# Patient Record
Sex: MALE | Race: WHITE | NOT HISPANIC OR LATINO | Employment: OTHER | RURAL
[De-identification: names, ages, dates, MRNs, and addresses within clinical notes are randomized per-mention and may not be internally consistent; named-entity substitution may affect disease eponyms.]

---

## 2017-01-13 ENCOUNTER — CONVERSION ENCOUNTER (OUTPATIENT)
Dept: FAMILY MEDICINE CLINIC | Facility: CLINIC | Age: 82
End: 2017-01-13

## 2017-01-13 LAB
INR PPP: 3.1 (ref 2–3.6)
PROTHROMBIN TIME: 29.2 SECONDS (ref 11.5–13.5)

## 2017-02-13 ENCOUNTER — CONVERSION ENCOUNTER (OUTPATIENT)
Dept: FAMILY MEDICINE CLINIC | Facility: CLINIC | Age: 82
End: 2017-02-13

## 2017-02-13 LAB
INR PPP: 3.6 (ref 2–3.6)
PROTHROMBIN TIME: 33 SECONDS (ref 11.5–13.5)

## 2017-03-24 ENCOUNTER — CONVERSION ENCOUNTER (OUTPATIENT)
Dept: FAMILY MEDICINE CLINIC | Facility: CLINIC | Age: 82
End: 2017-03-24

## 2017-03-24 LAB
INR PPP: 2.9 (ref 2–3.6)
PROTHROMBIN TIME: 35.3 SECONDS (ref 11.5–13.5)

## 2017-05-24 ENCOUNTER — CONVERSION ENCOUNTER (OUTPATIENT)
Dept: FAMILY MEDICINE CLINIC | Facility: CLINIC | Age: 82
End: 2017-05-24

## 2017-05-24 LAB
INR PPP: 3.1 (ref 2–3.6)
PROTHROMBIN TIME: 37.4 SECONDS (ref 11.5–13.5)

## 2017-06-14 ENCOUNTER — CONVERSION ENCOUNTER (OUTPATIENT)
Dept: CARDIOLOGY | Facility: CLINIC | Age: 82
End: 2017-06-14

## 2017-06-23 ENCOUNTER — CONVERSION ENCOUNTER (OUTPATIENT)
Dept: FAMILY MEDICINE CLINIC | Facility: CLINIC | Age: 82
End: 2017-06-23

## 2017-06-23 LAB
INR PPP: 2.4 (ref 2–3.6)
PROTHROMBIN TIME: 29.9 SECONDS (ref 11.5–13.5)

## 2017-07-17 ENCOUNTER — CONVERSION ENCOUNTER (OUTPATIENT)
Dept: FAMILY MEDICINE CLINIC | Facility: CLINIC | Age: 82
End: 2017-07-17

## 2017-07-18 LAB
ALBUMIN SERPL-MCNC: 4.2 G/DL (ref 3.6–5.1)
ALP SERPL-CCNC: 59 U/L (ref 40–115)
AST SERPL-CCNC: 26 U/L (ref 10–35)
BILIRUB SERPL-MCNC: 1.2 MG/DL (ref 0.2–1.2)
BUN SERPL-MCNC: 18 MG/DL (ref 7–25)
BUN/CREAT SERPL: 16.4 (CALC) (ref 6–22)
CALCIUM SERPL-MCNC: 9.4 MG/DL (ref 8.6–10.2)
CHLORIDE SERPL-SCNC: 105 MMOL/L (ref 98–110)
CHOLEST SERPL-MCNC: 166 MG/DL (ref 125–200)
CONV CO2: 24 MMOL/L (ref 21–33)
CONV TOTAL PROTEIN: 6.4 G/DL (ref 6.2–8.3)
CREAT UR-MCNC: 1.1 MG/DL (ref 0.67–1.54)
EOSINOPHIL # BLD AUTO: 200 CELLS/UL (ref 15–500)
EOSINOPHIL # BLD AUTO: 3 %
ERYTHROCYTE [DISTWIDTH] IN BLOOD BY AUTOMATED COUNT: 14.9 % (ref 11–15)
GLOBULIN UR ELPH-MCNC: 2.2 MG/DL (ref 2.1–3.7)
GLUCOSE UR QL: 94 MG/DL (ref 65–99)
HCT VFR BLD AUTO: 41.9 % (ref 38.5–50)
HDLC SERPL-MCNC: 59 MG/DL
HGB BLD-MCNC: 14.1 G/DL (ref 13.2–17.1)
INSULIN SERPL-ACNC: 1.9 (CALC) (ref 1–2.1)
LDLC SERPL CALC-MCNC: 85 MG/DL
LYMPHOCYTES # BLD AUTO: 1200 CELLS/UL (ref 850–3900)
LYMPHOCYTES NFR BLD AUTO: 21 %
MCH RBC QN AUTO: 29.3 PG (ref 27–33)
MCHC RBC AUTO-ENTMCNC: 33.7 G/DL (ref 32–36)
MCV RBC AUTO: 86.8 FL (ref 80–100)
MONOCYTES # BLD AUTO: 800 CELLS/UL (ref 200–950)
MONOCYTES NFR BLD AUTO: 14 %
NEUTROPHILS # BLD AUTO: 3600 CELLS/UL (ref 1500–7800)
NEUTROPHILS NFR BLD AUTO: 61 %
PLATELET # BLD AUTO: 134 10*3/UL (ref 140–400)
PMV BLD AUTO: 8.1 FL (ref 7.5–11.5)
POTASSIUM SERPL-SCNC: 4.1 MMOL/L (ref 3.5–5.3)
RBC # BLD AUTO: 4.83 MILLION/UL (ref 4.2–5.8)
SODIUM SERPL-SCNC: 139 MMOL/L (ref 135–146)
TRIGL SERPL-MCNC: 108 MG/DL
TSH SERPL-ACNC: 2.19 MIU/L (ref 0.4–4.5)
WBC # BLD AUTO: 5.8 10*3/UL (ref 3.8–10.8)

## 2017-09-08 ENCOUNTER — CONVERSION ENCOUNTER (OUTPATIENT)
Dept: FAMILY MEDICINE CLINIC | Facility: CLINIC | Age: 82
End: 2017-09-08

## 2017-09-08 LAB
INR PPP: 3.2 (ref 2–3.6)
PROTHROMBIN TIME: 39.4 SECONDS (ref 11.5–13.5)

## 2017-09-22 ENCOUNTER — CONVERSION ENCOUNTER (OUTPATIENT)
Dept: FAMILY MEDICINE CLINIC | Facility: CLINIC | Age: 82
End: 2017-09-22

## 2017-09-22 LAB
INR PPP: 3.1 (ref 2–3.6)
PROTHROMBIN TIME: 38.2 SECONDS (ref 11.5–13.5)

## 2017-10-23 ENCOUNTER — CONVERSION ENCOUNTER (OUTPATIENT)
Dept: FAMILY MEDICINE CLINIC | Facility: CLINIC | Age: 82
End: 2017-10-23

## 2017-11-27 ENCOUNTER — CONVERSION ENCOUNTER (OUTPATIENT)
Dept: FAMILY MEDICINE CLINIC | Facility: CLINIC | Age: 82
End: 2017-11-27

## 2017-11-27 LAB
INR PPP: 3.6 (ref 2–3.6)
PROTHROMBIN TIME: 43.7 SECONDS (ref 11.5–13.5)

## 2017-12-29 ENCOUNTER — CONVERSION ENCOUNTER (OUTPATIENT)
Dept: FAMILY MEDICINE CLINIC | Facility: CLINIC | Age: 82
End: 2017-12-29

## 2017-12-29 LAB
INR PPP: 3.6 (ref 2–3.6)
PROTHROMBIN TIME: 43.6 SECONDS (ref 11.5–13.5)

## 2018-01-29 ENCOUNTER — CONVERSION ENCOUNTER (OUTPATIENT)
Dept: FAMILY MEDICINE CLINIC | Facility: CLINIC | Age: 83
End: 2018-01-29

## 2018-01-29 LAB
INR PPP: 3.9 (ref 2–3.6)
PROTHROMBIN TIME: 47.4 SECONDS (ref 11.5–13.5)

## 2018-02-12 ENCOUNTER — CONVERSION ENCOUNTER (OUTPATIENT)
Dept: FAMILY MEDICINE CLINIC | Facility: CLINIC | Age: 83
End: 2018-02-12

## 2018-02-12 LAB
INR PPP: 1.7 (ref 2–3.6)
PROTHROMBIN TIME: 20.3 SECONDS (ref 11.5–13.5)

## 2018-02-14 ENCOUNTER — CONVERSION ENCOUNTER (OUTPATIENT)
Dept: CARDIOLOGY | Facility: CLINIC | Age: 83
End: 2018-02-14

## 2018-02-26 ENCOUNTER — CONVERSION ENCOUNTER (OUTPATIENT)
Dept: FAMILY MEDICINE CLINIC | Facility: CLINIC | Age: 83
End: 2018-02-26

## 2018-02-26 LAB
INR PPP: 4 (ref 2–3.6)
PROTHROMBIN TIME: 48.7 SECONDS (ref 11.5–13.5)

## 2018-03-06 ENCOUNTER — CONVERSION ENCOUNTER (OUTPATIENT)
Dept: FAMILY MEDICINE CLINIC | Facility: CLINIC | Age: 83
End: 2018-03-06

## 2018-03-06 LAB
INR PPP: 2.9 (ref 2–3.6)
PROTHROMBIN TIME: 35.9 SECONDS (ref 11.5–13.5)

## 2018-04-03 ENCOUNTER — CONVERSION ENCOUNTER (OUTPATIENT)
Dept: FAMILY MEDICINE CLINIC | Facility: CLINIC | Age: 83
End: 2018-04-03

## 2018-04-03 LAB
INR PPP: 3.5 (ref 2–3.6)
PROTHROMBIN TIME: 43 SECONDS (ref 11.5–13.5)

## 2018-06-15 ENCOUNTER — CONVERSION ENCOUNTER (OUTPATIENT)
Dept: FAMILY MEDICINE CLINIC | Facility: CLINIC | Age: 83
End: 2018-06-15

## 2018-06-15 LAB
INR PPP: 3.5 (ref 2–3.6)
PROTHROMBIN TIME: 43.5 SECONDS (ref 11.5–13.5)

## 2018-07-23 ENCOUNTER — CONVERSION ENCOUNTER (OUTPATIENT)
Dept: FAMILY MEDICINE CLINIC | Facility: CLINIC | Age: 83
End: 2018-07-23

## 2018-07-23 LAB
INR PPP: 2.5 (ref 2–3.6)
PROTHROMBIN TIME: 26.4 SECONDS (ref 11.5–13.5)

## 2018-08-28 ENCOUNTER — CONVERSION ENCOUNTER (OUTPATIENT)
Dept: FAMILY MEDICINE CLINIC | Facility: CLINIC | Age: 83
End: 2018-08-28

## 2018-08-28 LAB
INR PPP: 3.6 (ref 2–3.6)
PROTHROMBIN TIME: 44.5 SECONDS (ref 11.5–13.5)

## 2018-10-04 ENCOUNTER — CONVERSION ENCOUNTER (OUTPATIENT)
Dept: CARDIOLOGY | Facility: CLINIC | Age: 83
End: 2018-10-04

## 2018-10-29 ENCOUNTER — CONVERSION ENCOUNTER (OUTPATIENT)
Dept: FAMILY MEDICINE CLINIC | Facility: CLINIC | Age: 83
End: 2018-10-29

## 2018-10-29 LAB
INR PPP: 3.2 (ref 2–3.6)
PROTHROMBIN TIME: 40.6 SECONDS (ref 11.5–13.5)

## 2018-11-21 ENCOUNTER — CONVERSION ENCOUNTER (OUTPATIENT)
Dept: FAMILY MEDICINE CLINIC | Facility: CLINIC | Age: 83
End: 2018-11-21

## 2018-11-21 LAB
INR PPP: 2.3 (ref 2–3.6)
PROTHROMBIN TIME: 28.1 SECONDS (ref 11.5–13.5)

## 2018-12-05 ENCOUNTER — CONVERSION ENCOUNTER (OUTPATIENT)
Dept: FAMILY MEDICINE CLINIC | Facility: CLINIC | Age: 83
End: 2018-12-05

## 2018-12-05 LAB
INR PPP: 3.7 (ref 2–3.6)
PROTHROMBIN TIME: 46.5 SECONDS (ref 11.5–13.5)

## 2018-12-19 ENCOUNTER — CONVERSION ENCOUNTER (OUTPATIENT)
Dept: FAMILY MEDICINE CLINIC | Facility: CLINIC | Age: 83
End: 2018-12-19

## 2018-12-19 LAB
INR PPP: 4.1 (ref 2–3.6)
PROTHROMBIN TIME: 52.6 SECONDS (ref 11.5–13.5)

## 2018-12-26 ENCOUNTER — CONVERSION ENCOUNTER (OUTPATIENT)
Dept: FAMILY MEDICINE CLINIC | Facility: CLINIC | Age: 83
End: 2018-12-26

## 2018-12-26 LAB
INR PPP: 2.8 (ref 2–3.6)
PROTHROMBIN TIME: 35.3 SECONDS (ref 11.5–13.5)

## 2019-01-09 ENCOUNTER — CONVERSION ENCOUNTER (OUTPATIENT)
Dept: FAMILY MEDICINE CLINIC | Facility: CLINIC | Age: 84
End: 2019-01-09

## 2019-01-09 LAB
INR PPP: 3 (ref 2–3.6)
PROTHROMBIN TIME: 37.5 SECONDS (ref 11.5–13.5)

## 2019-01-23 ENCOUNTER — CONVERSION ENCOUNTER (OUTPATIENT)
Dept: FAMILY MEDICINE CLINIC | Facility: CLINIC | Age: 84
End: 2019-01-23

## 2019-01-23 LAB
INR PPP: 2.7 (ref 2–3.6)
PROTHROMBIN TIME: 33.4 SECONDS (ref 11.5–13.5)

## 2019-03-27 ENCOUNTER — CONVERSION ENCOUNTER (OUTPATIENT)
Dept: CARDIOLOGY | Facility: CLINIC | Age: 84
End: 2019-03-27

## 2019-04-16 ENCOUNTER — CONVERSION ENCOUNTER (OUTPATIENT)
Dept: FAMILY MEDICINE CLINIC | Facility: CLINIC | Age: 84
End: 2019-04-16

## 2019-04-16 LAB
INR PPP: 2.3 (ref 2–3.6)
PROTHROMBIN TIME: 28.1 SECONDS (ref 11.5–13.5)

## 2019-04-17 ENCOUNTER — CONVERSION ENCOUNTER (OUTPATIENT)
Dept: CARDIOLOGY | Facility: CLINIC | Age: 84
End: 2019-04-17

## 2019-05-15 ENCOUNTER — CONVERSION ENCOUNTER (OUTPATIENT)
Dept: FAMILY MEDICINE CLINIC | Facility: CLINIC | Age: 84
End: 2019-05-15

## 2019-05-15 LAB
INR PPP: 1.6 (ref 2–3.6)
PROTHROMBIN TIME: 19.7 SECONDS (ref 11.5–13.5)

## 2019-05-29 LAB — INR PPP: 2.7 (ref 2–3)

## 2019-06-04 VITALS
SYSTOLIC BLOOD PRESSURE: 195 MMHG | OXYGEN SATURATION: 98 % | OXYGEN SATURATION: 99 % | OXYGEN SATURATION: 99 % | HEART RATE: 59 BPM | SYSTOLIC BLOOD PRESSURE: 168 MMHG | HEART RATE: 66 BPM | HEART RATE: 64 BPM | WEIGHT: 186.25 LBS | WEIGHT: 185.5 LBS | WEIGHT: 184.5 LBS | SYSTOLIC BLOOD PRESSURE: 165 MMHG | WEIGHT: 180.25 LBS | HEART RATE: 60 BPM | WEIGHT: 182.5 LBS | DIASTOLIC BLOOD PRESSURE: 99 MMHG | HEART RATE: 79 BPM | SYSTOLIC BLOOD PRESSURE: 138 MMHG | DIASTOLIC BLOOD PRESSURE: 89 MMHG | DIASTOLIC BLOOD PRESSURE: 107 MMHG | OXYGEN SATURATION: 97 % | OXYGEN SATURATION: 97 % | DIASTOLIC BLOOD PRESSURE: 104 MMHG | SYSTOLIC BLOOD PRESSURE: 182 MMHG | DIASTOLIC BLOOD PRESSURE: 92 MMHG

## 2019-06-12 VITALS
OXYGEN SATURATION: 99 % | RESPIRATION RATE: 20 BRPM | WEIGHT: 182 LBS | BODY MASS INDEX: 26.04 KG/M2 | HEIGHT: 71 IN | BODY MASS INDEX: 26.88 KG/M2 | HEIGHT: 72 IN | WEIGHT: 178.2 LBS | DIASTOLIC BLOOD PRESSURE: 92 MMHG | WEIGHT: 183 LBS | HEART RATE: 76 BPM | HEART RATE: 75 BPM | DIASTOLIC BLOOD PRESSURE: 97 MMHG | BODY MASS INDEX: 26.06 KG/M2 | DIASTOLIC BLOOD PRESSURE: 89 MMHG | HEART RATE: 97 BPM | HEART RATE: 65 BPM | BODY MASS INDEX: 26.54 KG/M2 | HEIGHT: 70 IN | HEIGHT: 70 IN | SYSTOLIC BLOOD PRESSURE: 140 MMHG | OXYGEN SATURATION: 98 % | HEIGHT: 70 IN | WEIGHT: 185.6 LBS | HEART RATE: 60 BPM | RESPIRATION RATE: 20 BRPM | RESPIRATION RATE: 20 BRPM | WEIGHT: 187.8 LBS | SYSTOLIC BLOOD PRESSURE: 148 MMHG | HEIGHT: 71 IN | RESPIRATION RATE: 18 BRPM | HEART RATE: 89 BPM | OXYGEN SATURATION: 93 % | OXYGEN SATURATION: 98 % | SYSTOLIC BLOOD PRESSURE: 151 MMHG | DIASTOLIC BLOOD PRESSURE: 84 MMHG | OXYGEN SATURATION: 98 % | HEART RATE: 80 BPM | HEIGHT: 71 IN | DIASTOLIC BLOOD PRESSURE: 70 MMHG | DIASTOLIC BLOOD PRESSURE: 98 MMHG | HEIGHT: 70 IN | BODY MASS INDEX: 25.4 KG/M2 | RESPIRATION RATE: 19 BRPM | HEART RATE: 89 BPM | WEIGHT: 183.8 LBS | WEIGHT: 185.8 LBS | BODY MASS INDEX: 26.26 KG/M2 | HEIGHT: 72 IN | WEIGHT: 185 LBS | HEART RATE: 89 BPM | HEIGHT: 70 IN | DIASTOLIC BLOOD PRESSURE: 82 MMHG | WEIGHT: 184.12 LBS | HEART RATE: 87 BPM | RESPIRATION RATE: 20 BRPM | OXYGEN SATURATION: 96 % | OXYGEN SATURATION: 99 % | WEIGHT: 184.4 LBS | RESPIRATION RATE: 18 BRPM | HEIGHT: 71 IN | WEIGHT: 184 LBS | HEIGHT: 70 IN | BODY MASS INDEX: 26.48 KG/M2 | WEIGHT: 185.37 LBS | WEIGHT: 184 LBS | WEIGHT: 184.12 LBS | HEART RATE: 84 BPM | WEIGHT: 184.12 LBS | DIASTOLIC BLOOD PRESSURE: 80 MMHG | HEART RATE: 84 BPM | BODY MASS INDEX: 25.06 KG/M2 | OXYGEN SATURATION: 96 % | RESPIRATION RATE: 14 BRPM | HEART RATE: 82 BPM | HEART RATE: 82 BPM | HEIGHT: 71 IN | RESPIRATION RATE: 20 BRPM | BODY MASS INDEX: 25.76 KG/M2 | OXYGEN SATURATION: 99 % | HEART RATE: 78 BPM | BODY MASS INDEX: 26.21 KG/M2 | BODY MASS INDEX: 25.81 KG/M2 | SYSTOLIC BLOOD PRESSURE: 134 MMHG | BODY MASS INDEX: 26.01 KG/M2 | OXYGEN SATURATION: 98 % | HEART RATE: 82 BPM | SYSTOLIC BLOOD PRESSURE: 138 MMHG | WEIGHT: 183.6 LBS | HEIGHT: 71 IN | WEIGHT: 181.4 LBS | HEIGHT: 71 IN | SYSTOLIC BLOOD PRESSURE: 150 MMHG | HEIGHT: 70 IN | OXYGEN SATURATION: 98 % | SYSTOLIC BLOOD PRESSURE: 130 MMHG | DIASTOLIC BLOOD PRESSURE: 100 MMHG | SYSTOLIC BLOOD PRESSURE: 170 MMHG | RESPIRATION RATE: 20 BRPM | OXYGEN SATURATION: 98 % | OXYGEN SATURATION: 96 % | HEART RATE: 85 BPM | SYSTOLIC BLOOD PRESSURE: 156 MMHG | DIASTOLIC BLOOD PRESSURE: 92 MMHG | RESPIRATION RATE: 18 BRPM | HEIGHT: 70 IN | OXYGEN SATURATION: 97 % | BODY MASS INDEX: 24.94 KG/M2 | DIASTOLIC BLOOD PRESSURE: 80 MMHG | SYSTOLIC BLOOD PRESSURE: 132 MMHG | WEIGHT: 191 LBS | DIASTOLIC BLOOD PRESSURE: 87 MMHG | OXYGEN SATURATION: 99 % | OXYGEN SATURATION: 98 % | HEART RATE: 87 BPM | OXYGEN SATURATION: 94 % | SYSTOLIC BLOOD PRESSURE: 160 MMHG | HEART RATE: 68 BPM | OXYGEN SATURATION: 98 % | HEART RATE: 86 BPM | HEIGHT: 71 IN | DIASTOLIC BLOOD PRESSURE: 80 MMHG | SYSTOLIC BLOOD PRESSURE: 134 MMHG | OXYGEN SATURATION: 99 % | HEIGHT: 72 IN | HEIGHT: 72 IN | SYSTOLIC BLOOD PRESSURE: 144 MMHG | RESPIRATION RATE: 16 BRPM | SYSTOLIC BLOOD PRESSURE: 138 MMHG | SYSTOLIC BLOOD PRESSURE: 152 MMHG | OXYGEN SATURATION: 98 % | BODY MASS INDEX: 25.7 KG/M2 | RESPIRATION RATE: 20 BRPM | WEIGHT: 187.4 LBS | DIASTOLIC BLOOD PRESSURE: 88 MMHG | HEART RATE: 70 BPM | SYSTOLIC BLOOD PRESSURE: 136 MMHG | SYSTOLIC BLOOD PRESSURE: 132 MMHG | DIASTOLIC BLOOD PRESSURE: 92 MMHG | BODY MASS INDEX: 25.98 KG/M2 | WEIGHT: 187.2 LBS | WEIGHT: 189 LBS | WEIGHT: 183.4 LBS | OXYGEN SATURATION: 97 % | DIASTOLIC BLOOD PRESSURE: 90 MMHG | HEART RATE: 86 BPM | RESPIRATION RATE: 20 BRPM | OXYGEN SATURATION: 97 % | HEIGHT: 71 IN | DIASTOLIC BLOOD PRESSURE: 88 MMHG | OXYGEN SATURATION: 97 % | HEIGHT: 71 IN | SYSTOLIC BLOOD PRESSURE: 145 MMHG | RESPIRATION RATE: 18 BRPM | SYSTOLIC BLOOD PRESSURE: 140 MMHG | OXYGEN SATURATION: 97 % | RESPIRATION RATE: 16 BRPM | HEART RATE: 84 BPM | BODY MASS INDEX: 25.6 KG/M2 | HEART RATE: 81 BPM | HEIGHT: 71 IN | BODY MASS INDEX: 26.83 KG/M2 | DIASTOLIC BLOOD PRESSURE: 91 MMHG | HEART RATE: 81 BPM | DIASTOLIC BLOOD PRESSURE: 75 MMHG | SYSTOLIC BLOOD PRESSURE: 136 MMHG | HEART RATE: 60 BPM | WEIGHT: 192.4 LBS | SYSTOLIC BLOOD PRESSURE: 148 MMHG | DIASTOLIC BLOOD PRESSURE: 86 MMHG | WEIGHT: 186 LBS | BODY MASS INDEX: 24.94 KG/M2 | SYSTOLIC BLOOD PRESSURE: 150 MMHG | RESPIRATION RATE: 18 BRPM | HEIGHT: 72 IN | WEIGHT: 179 LBS | DIASTOLIC BLOOD PRESSURE: 88 MMHG | BODY MASS INDEX: 25.73 KG/M2 | RESPIRATION RATE: 18 BRPM | HEIGHT: 71 IN | DIASTOLIC BLOOD PRESSURE: 102 MMHG | RESPIRATION RATE: 20 BRPM | RESPIRATION RATE: 19 BRPM | BODY MASS INDEX: 26.34 KG/M2 | RESPIRATION RATE: 18 BRPM | RESPIRATION RATE: 21 BRPM | BODY MASS INDEX: 24.94 KG/M2 | RESPIRATION RATE: 20 BRPM | DIASTOLIC BLOOD PRESSURE: 86 MMHG | OXYGEN SATURATION: 97 % | DIASTOLIC BLOOD PRESSURE: 82 MMHG | DIASTOLIC BLOOD PRESSURE: 75 MMHG | SYSTOLIC BLOOD PRESSURE: 138 MMHG | RESPIRATION RATE: 20 BRPM | BODY MASS INDEX: 25.62 KG/M2 | BODY MASS INDEX: 26.27 KG/M2 | SYSTOLIC BLOOD PRESSURE: 149 MMHG | SYSTOLIC BLOOD PRESSURE: 128 MMHG | BODY MASS INDEX: 26.74 KG/M2 | OXYGEN SATURATION: 98 % | RESPIRATION RATE: 18 BRPM | BODY MASS INDEX: 24.95 KG/M2 | WEIGHT: 183.5 LBS | BODY MASS INDEX: 26.05 KG/M2 | HEIGHT: 71 IN | SYSTOLIC BLOOD PRESSURE: 147 MMHG | DIASTOLIC BLOOD PRESSURE: 78 MMHG

## 2019-06-28 ENCOUNTER — CLINICAL SUPPORT (OUTPATIENT)
Dept: FAMILY MEDICINE CLINIC | Facility: CLINIC | Age: 84
End: 2019-06-28

## 2019-06-28 VITALS
WEIGHT: 182 LBS | RESPIRATION RATE: 17 BRPM | TEMPERATURE: 97.8 F | HEART RATE: 64 BPM | OXYGEN SATURATION: 98 % | BODY MASS INDEX: 26.11 KG/M2

## 2019-06-28 DIAGNOSIS — Z95.2 HISTORY OF MITRAL VALVE REPLACEMENT: Primary | ICD-10-CM

## 2019-06-28 DIAGNOSIS — Z95.2 HEART VALVE REPLACED: Primary | ICD-10-CM

## 2019-06-28 PROBLEM — I10 HYPERTENSION: Status: ACTIVE | Noted: 2019-06-28

## 2019-06-28 PROBLEM — I48.91 ATRIAL FIBRILLATION (HCC): Status: ACTIVE | Noted: 2019-06-28

## 2019-06-28 PROBLEM — I25.10 CORONARY ARTERY DISEASE: Status: ACTIVE | Noted: 2019-06-28

## 2019-06-28 PROBLEM — I63.9 CEREBROVASCULAR ACCIDENT (CVA) (HCC): Status: ACTIVE | Noted: 2019-06-28

## 2019-06-28 PROBLEM — E78.5 HYPERLIPIDEMIA: Status: ACTIVE | Noted: 2019-06-28

## 2019-06-28 LAB
INR PPP: 2.3 (ref 2–3)
INR PPP: 2.3 (ref 2–3)

## 2019-06-28 PROCEDURE — 93793 ANTICOAG MGMT PT WARFARIN: CPT | Performed by: FAMILY MEDICINE

## 2019-06-28 PROCEDURE — 85610 PROTHROMBIN TIME: CPT | Performed by: FAMILY MEDICINE

## 2019-06-28 PROCEDURE — 36416 COLLJ CAPILLARY BLOOD SPEC: CPT | Performed by: FAMILY MEDICINE

## 2019-06-28 RX ORDER — LOVASTATIN 20 MG/1
40 TABLET ORAL DAILY
Refills: 3 | COMMUNITY
Start: 2019-06-08 | End: 2020-02-26 | Stop reason: SDUPTHER

## 2019-06-28 RX ORDER — LISINOPRIL 10 MG/1
10 TABLET ORAL DAILY
Refills: 12 | COMMUNITY
Start: 2019-06-08 | End: 2020-02-26

## 2019-06-28 RX ORDER — WARFARIN SODIUM 3 MG/1
3 TABLET ORAL EVERY 24 HOURS
Qty: 45 TABLET | Refills: 1 | Status: SHIPPED | OUTPATIENT
Start: 2019-06-28 | End: 2019-09-27 | Stop reason: SDUPTHER

## 2019-06-28 RX ORDER — WARFARIN SODIUM 3 MG/1
TABLET ORAL EVERY 24 HOURS
COMMUNITY
Start: 2016-02-16 | End: 2019-06-28 | Stop reason: SDUPTHER

## 2019-06-28 NOTE — TELEPHONE ENCOUNTER
Patient was last seen on, 6- for PT/INR check.    Patient has an appointment to be seen on, 7- PT/INR check.    Patient is completely out of medication.    Please advise.  Thanks!

## 2019-07-22 ENCOUNTER — CLINICAL SUPPORT NO REQUIREMENTS (OUTPATIENT)
Dept: CARDIOLOGY | Facility: CLINIC | Age: 84
End: 2019-07-22

## 2019-07-22 DIAGNOSIS — Z95.0 PACEMAKER: ICD-10-CM

## 2019-07-22 DIAGNOSIS — R00.1 BRADYCARDIA: Primary | ICD-10-CM

## 2019-07-22 PROCEDURE — 93296 REM INTERROG EVL PM/IDS: CPT | Performed by: INTERNAL MEDICINE

## 2019-07-22 PROCEDURE — 93294 REM INTERROG EVL PM/LDLS PM: CPT | Performed by: INTERNAL MEDICINE

## 2019-07-26 ENCOUNTER — CLINICAL SUPPORT (OUTPATIENT)
Dept: FAMILY MEDICINE CLINIC | Facility: CLINIC | Age: 84
End: 2019-07-26

## 2019-07-26 VITALS
RESPIRATION RATE: 20 BRPM | OXYGEN SATURATION: 98 % | SYSTOLIC BLOOD PRESSURE: 154 MMHG | HEART RATE: 64 BPM | TEMPERATURE: 97.7 F | BODY MASS INDEX: 25.91 KG/M2 | DIASTOLIC BLOOD PRESSURE: 90 MMHG | WEIGHT: 180.6 LBS

## 2019-07-26 DIAGNOSIS — I48.91 ATRIAL FIBRILLATION, UNSPECIFIED TYPE (HCC): Primary | ICD-10-CM

## 2019-07-26 LAB
INR PPP: 2.5 (ref 0.9–1.1)
INR PPP: 2.5 (ref 0.9–1.1)
PROTHROMBIN TIME: 31.4 SECONDS

## 2019-07-26 PROCEDURE — 36416 COLLJ CAPILLARY BLOOD SPEC: CPT | Performed by: FAMILY MEDICINE

## 2019-07-26 PROCEDURE — 93793 ANTICOAG MGMT PT WARFARIN: CPT | Performed by: FAMILY MEDICINE

## 2019-07-26 PROCEDURE — 85610 PROTHROMBIN TIME: CPT | Performed by: FAMILY MEDICINE

## 2019-08-02 ENCOUNTER — OFFICE VISIT (OUTPATIENT)
Dept: FAMILY MEDICINE CLINIC | Facility: CLINIC | Age: 84
End: 2019-08-02

## 2019-08-02 VITALS
SYSTOLIC BLOOD PRESSURE: 148 MMHG | DIASTOLIC BLOOD PRESSURE: 80 MMHG | RESPIRATION RATE: 18 BRPM | WEIGHT: 179.8 LBS | HEIGHT: 70 IN | HEART RATE: 68 BPM | TEMPERATURE: 97.2 F | BODY MASS INDEX: 25.74 KG/M2 | OXYGEN SATURATION: 99 %

## 2019-08-02 DIAGNOSIS — I10 ESSENTIAL HYPERTENSION: ICD-10-CM

## 2019-08-02 DIAGNOSIS — Z00.00 MEDICARE ANNUAL WELLNESS VISIT, SUBSEQUENT: Primary | ICD-10-CM

## 2019-08-02 DIAGNOSIS — R53.83 MALAISE AND FATIGUE: ICD-10-CM

## 2019-08-02 DIAGNOSIS — Z95.0 PRESENCE OF CARDIAC PACEMAKER: ICD-10-CM

## 2019-08-02 DIAGNOSIS — Z95.2 HISTORY OF HEART VALVE REPLACEMENT: ICD-10-CM

## 2019-08-02 DIAGNOSIS — E78.2 MIXED HYPERLIPIDEMIA: ICD-10-CM

## 2019-08-02 DIAGNOSIS — R53.81 MALAISE AND FATIGUE: ICD-10-CM

## 2019-08-02 PROBLEM — H61.23 BILATERAL IMPACTED CERUMEN: Status: ACTIVE | Noted: 2019-08-02

## 2019-08-02 LAB
BILIRUB BLD-MCNC: NEGATIVE MG/DL
CLARITY, POC: CLEAR
COLOR UR: YELLOW
GLUCOSE UR STRIP-MCNC: NEGATIVE MG/DL
KETONES UR QL: NEGATIVE
LEUKOCYTE EST, POC: NEGATIVE
NITRITE UR-MCNC: NEGATIVE MG/ML
PH UR: 7 [PH] (ref 5–8)
PROT UR STRIP-MCNC: NEGATIVE MG/DL
RBC # UR STRIP: NEGATIVE /UL
SP GR UR: 1.02 (ref 1–1.03)
UROBILINOGEN UR QL: NORMAL

## 2019-08-02 PROCEDURE — 99497 ADVNCD CARE PLAN 30 MIN: CPT | Performed by: FAMILY MEDICINE

## 2019-08-02 PROCEDURE — 81002 URINALYSIS NONAUTO W/O SCOPE: CPT | Performed by: FAMILY MEDICINE

## 2019-08-02 PROCEDURE — G0444 DEPRESSION SCREEN ANNUAL: HCPCS | Performed by: FAMILY MEDICINE

## 2019-08-02 PROCEDURE — 99213 OFFICE O/P EST LOW 20 MIN: CPT | Performed by: FAMILY MEDICINE

## 2019-08-02 PROCEDURE — G0439 PPPS, SUBSEQ VISIT: HCPCS | Performed by: FAMILY MEDICINE

## 2019-08-02 NOTE — PROGRESS NOTES
Subjective   Jean Pierre Lazo is a 89 y.o. male.     Chief Complaint   Patient presents with   • Medicare Wellness-subsequent     last ekg 9/2367/2016 last echo 8/2/2016   • Hypertension   • Heart Problem     has a pacemaker last pt/inr was 7/26/2019 and was 2.5 he is taking coumadin 3.5mg on monday and 3mg all other days.       The patient is here: to discuss health maintenance and disease prevention to follow up on multiple medical problems.  Last comprehensive physical was on 7/2018.  Patient's previous physician was Dr.John Cage.  Overall has: moderate activity with work/home activities, good appetite, poor appetite, feels well with minor complaints, decreased energy level and is sleeping well. Nutrition: balanced diet. Last tetanus shot was 7/15/2018. Patient's last colonoscopy was: 1/6/2010.    Hypertension   This is a chronic problem. The current episode started more than 1 year ago. The problem is unchanged. Associated symptoms include malaise/fatigue. Pertinent negatives include no chest pain, palpitations or shortness of breath. Risk factors for coronary artery disease include male gender. Current antihypertension treatment includes ACE inhibitors. There are no compliance problems.         Recent Hospitalizations:  No hospitalization(s) within the last year..  ccc    I personally reviewed and updated the patient's allergies, medications, problem list, and past medical, surgical, social, and family history.     History reviewed. No pertinent family history.    Social History     Tobacco Use   • Smoking status: Never Smoker   • Smokeless tobacco: Never Used   Substance Use Topics   • Alcohol use: No     Frequency: Never   • Drug use: No       Past Surgical History:   Procedure Laterality Date   • CARDIAC VALVE SURGERY     • CHOLECYSTECTOMY     • PACEMAKER IMPLANTATION         Patient Active Problem List   Diagnosis   • Atrial fibrillation (CMS/HCC)   • Atrioventricular block   • Cerebrovascular accident  "(CVA) (CMS/HCC)   • Cholelithiasis with cholecystitis   • Coronary artery disease   • History of heart valve replacement   • Hyperlipidemia   • Hypertension   • Mitral valve disease   • Presence of aortocoronary bypass graft   • Presence of cardiac pacemaker   • Medicare annual wellness visit, subsequent   • Bilateral impacted cerumen         Current Outpatient Medications:   •  lisinopril (PRINIVIL,ZESTRIL) 10 MG tablet, Take 10 mg by mouth Daily., Disp: , Rfl: 12  •  lovastatin (MEVACOR) 20 MG tablet, Take 40 mg by mouth Daily., Disp: , Rfl: 3  •  metoprolol tartrate (LOPRESSOR) 25 MG tablet, METOPROLOL TARTRATE 25 MG TABS, Disp: , Rfl:   •  Multiple Vitamins-Minerals (MULTI VITAMIN/MINERALS) tablet, MULTI VITAMIN/MINERALS TABS, Disp: , Rfl:   •  warfarin (COUMADIN) 3 MG tablet, Take 1 tablet by mouth Daily., Disp: 45 tablet, Rfl: 1  •  docusate (COLACE) 60 MG/15ML syrup, 5 drops to each ear 3 nights per week at , dispense qs with ear dropper, Disp: 30 mL, Rfl: 11         Review of Systems   Constitutional: Positive for malaise/fatigue. Negative for chills and diaphoresis.   HENT: Negative for trouble swallowing and voice change.    Eyes: Negative for visual disturbance.   Respiratory: Negative for shortness of breath.    Cardiovascular: Negative for chest pain and palpitations.   Gastrointestinal: Negative for abdominal pain and nausea.   Endocrine: Negative for polydipsia and polyphagia.   Genitourinary: Negative for hematuria.   Musculoskeletal: Negative for neck stiffness.   Skin: Negative for color change and pallor.   Allergic/Immunologic: Negative for immunocompromised state.   Neurological: Negative for seizures and syncope.   Hematological: Negative for adenopathy.   Psychiatric/Behavioral: Negative for sleep disturbance and suicidal ideas.       Objective   /80   Pulse 68   Temp 97.2 °F (36.2 °C)   Resp 18   Ht 177.8 cm (70\")   Wt 81.6 kg (179 lb 12.8 oz)   SpO2 99%   BMI 25.80 kg/m²   BP " Readings from Last 3 Encounters:   08/02/19 148/80   07/26/19 154/90   05/29/19 140/88     Wt Readings from Last 3 Encounters:   08/02/19 81.6 kg (179 lb 12.8 oz)   07/26/19 81.9 kg (180 lb 9.6 oz)   06/28/19 82.6 kg (182 lb)     Physical Exam   Constitutional: He is oriented to person, place, and time. He appears well-developed and well-nourished.   HENT:   Head: Normocephalic.   Right Ear: Tympanic membrane, external ear and ear canal normal.   Left Ear: Tympanic membrane, external ear and ear canal normal.   Nose: Nose normal.   Eyes: Conjunctivae, EOM and lids are normal. Pupils are equal, round, and reactive to light.   Neck: No JVD present. Carotid bruit is not present. No tracheal deviation present. No thyromegaly present.   Cardiovascular: Normal rate, regular rhythm, normal heart sounds and intact distal pulses. Exam reveals no gallop and no friction rub.   No murmur heard.  Pulmonary/Chest: Effort normal and breath sounds normal. No stridor. He has no decreased breath sounds. He has no wheezes. He has no rales.   Abdominal: Soft. Bowel sounds are normal. He exhibits no distension and no mass. There is no tenderness. There is no rebound and no guarding. No hernia.   Lymphadenopathy:        Head (right side): No submental, no submandibular, no tonsillar, no preauricular, no posterior auricular and no occipital adenopathy present.        Head (left side): No submental, no submandibular, no tonsillar, no preauricular, no posterior auricular and no occipital adenopathy present.     He has no cervical adenopathy.   Neurological: He is alert and oriented to person, place, and time. He has normal strength and normal reflexes. No cranial nerve deficit or sensory deficit. Coordination and gait normal.   Skin: Skin is warm and dry. Turgor is normal. He is not diaphoretic. No pallor.       Recent Lab Results:    Lab Results   Component Value Date    GLU 92 08/02/2019     Lab Results   Component Value Date    HGBA1C  4.8 06/06/2016     Lab Results   Component Value Date    CHOL 136 07/20/2018    CHOL 166 07/17/2017    CHOL 157 05/03/2016    CHLPL 144 08/02/2019     Lab Results   Component Value Date    TRIG 101 08/02/2019    TRIG 94 07/20/2018    TRIG 108 07/17/2017     Lab Results   Component Value Date    HDL 53 08/02/2019    HDL 46 07/20/2018    HDL 59 07/17/2017     Lab Results   Component Value Date    LDL 71 08/02/2019    LDL 71 07/20/2018    LDL 85 07/17/2017     No results found for: PSA  Lab Results   Component Value Date    WBC 7.2 08/02/2019    HGB 14.4 08/02/2019    HCT 42.9 08/02/2019    MCV 89 08/02/2019     (L) 08/02/2019     Lab Results   Component Value Date    TSH 1.850 08/02/2019     Lab Results   Component Value Date    GLUCOSE 101 (H) 03/29/2019    BUN 17 08/02/2019    CREATININE 1.06 08/02/2019    EGFRIFNONA 62 08/02/2019    EGFRIFAFRI 72 08/02/2019    BCR 16 08/02/2019    K 4.7 08/02/2019    CO2 23 08/02/2019    CALCIUM 9.4 08/02/2019    PROTENTOTREF 6.6 08/02/2019    ALBUMIN 4.2 08/02/2019    LABIL2 1.8 08/02/2019    AST 27 08/02/2019    ALT 8 08/02/2019         Age-appropriate Screening Schedule:  Refer to the list below for future screening recommendations based on patient's age, sex and/or medical conditions. Orders for these recommended tests are listed in the plan section. The patient has been provided with a written plan.    Health Maintenance   Topic Date Due   • TDAP/TD VACCINES (1 - Tdap) 07/25/1949   • ZOSTER VACCINE (1 of 2) 07/25/1980   • PNEUMOCOCCAL VACCINES (65+ LOW/MEDIUM RISK) (1 of 2 - PCV13) 07/25/1995   • INFLUENZA VACCINE  08/01/2019   • LIPID PANEL  08/02/2020       Depression Screen:   PHQ-2/PHQ-9 Depression Screening 8/2/2019   Little interest or pleasure in doing things 0   Feeling down, depressed, or hopeless 0   Trouble falling or staying asleep, or sleeping too much 0   Feeling tired or having little energy 0   Poor appetite or overeating 0   Feeling bad about yourself -  or that you are a failure or have let yourself or your family down 0   Trouble concentrating on things, such as reading the newspaper or watching television 0   Moving or speaking so slowly that other people could have noticed. Or the opposite - being so fidgety or restless that you have been moving around a lot more than usual 0   Thoughts that you would be better off dead, or of hurting yourself in some way 0   Total Score 0   If you checked off any problems, how difficult have these problems made it for you to do your work, take care of things at home, or get along with other people? Not difficult at all     I spent more than 16 minutes asking patient questions, counseling and documenting in the chart.    Health Habits and Functional and Cognitive Screening:  Functional & Cognitive Status 8/2/2019   Do you have difficulty preparing food and eating? No   Do you have difficulty bathing yourself, getting dressed or grooming yourself? No   Do you have difficulty using the toilet? No   Do you have difficulty moving around from place to place? No   Do you have trouble with steps or getting out of a bed or a chair? No   Current Diet Well Balanced Diet   Dental Exam Up to date   Eye Exam Up to date   Exercise (times per week) 5 times per week   Current Exercise Activities Include Yard Work   Do you need help using the phone?  No   Are you deaf or do you have serious difficulty hearing?  No   Do you need help with transportation? No   Do you need help shopping? No   Do you need help preparing meals?  No   Do you need help with housework?  No   Do you need help with laundry? No   Do you need help taking your medications? No   Do you need help managing money? No   Do you ever drive or ride in a car without wearing a seat belt? No   Have you felt unusual stress, anger or loneliness in the last month? No   Who do you live with? Spouse   If you need help, do you have trouble finding someone available to you? No   Have you been  bothered in the last four weeks by sexual problems? No   Do you have difficulty concentrating, remembering or making decisions? No       Does the patient have evidence of cognitive impairment? No    Advance Care Planning:  Patient has an advance directive - a copy has been provided and is visible in patient header     A face-to-face visit was completed today with patient.  Counseling explanation, and discussion of advanced directives was performed.   The last advanced care visit was performed in 2018.  In a near life ending situation, from which he is not expected to recover functionally, and he is not able to speak for him, he does not want life sustaining measures. We discussed feeding tubes, ventilators and cardiac support as well as dialysis.    I spent more than 16 minutes discussing Advanced Care Planning information and documenting in the chart.    Identification of Risk Factors:  Risk factors include: Cardiovascular risk  Dementia/Memory   Fall Risk  Hearing Problem.    Compared to one year ago, the patient feels his physical health is the same.  Compared to one year ago, the patient feels his mental health is the same.    Patient Self-Management and Personalized Health Advice  The patient has been provided with information about: diet, exercise, weight management, prevention of cardiac or vascular disease, the relationship between weight and GERD, fall prevention, supplements and mental health concerns and preventive services including:   · Alcohol Misuse Screening and Counseling  (15 minutes counseling time, Code )  · Annual Wellness Visit (AWV)  · Counseling to Prevent Tobacco Use (use of smartset and [email or web address]@ smartphrase for documentation)  · Depression Screening (15 minutes face to face, Code )  · Influenza Vaccine and Administration  · Pneumococcal Vaccine and Administration.      Assessment/Plan   Problem List Items Addressed This Visit        Cardiovascular and Mediastinum    History of  heart valve replacement    Overview     Doing well  Benefit coumadin outweighs risk currently  follwoed by cardiology         Hyperlipidemia    Relevant Orders    Lipid Panel With / Chol / HDL Ratio (Completed)    Hypertension    Overview     Overall good control  Discuss low sodium diet, lifestyle modification.          Relevant Orders    CBC Auto Differential    Comprehensive Metabolic Panel (Completed)    Presence of cardiac pacemaker       Other    Medicare annual wellness visit, subsequent - Primary    Overview     Doing well, vaccines current  Age specific anticipatory guidance and warning signs discussed.  Diet, exercise, and lifestyle modification discussed.  Safety, seatbelts, and routine screenbng examinations discussed.  Discussed self-examinations.          Relevant Orders    POCT urinalysis dipstick, manual (Completed)      Other Visit Diagnoses     Malaise and fatigue        Relevant Orders    TSH (Completed)              Expected course, medications, and adverse effects discussed.  Call or return if worsening or persistent symptoms.

## 2019-08-03 LAB
ALBUMIN SERPL-MCNC: 4.2 G/DL (ref 3.5–4.7)
ALBUMIN/GLOB SERPL: 1.8 {RATIO} (ref 1.2–2.2)
ALP SERPL-CCNC: 54 IU/L (ref 39–117)
ALT SERPL-CCNC: 8 IU/L (ref 0–44)
AST SERPL-CCNC: 27 IU/L (ref 0–40)
BASOPHILS # BLD AUTO: 0 X10E3/UL (ref 0–0.2)
BASOPHILS NFR BLD AUTO: 0 %
BILIRUB SERPL-MCNC: 1.2 MG/DL (ref 0–1.2)
BUN SERPL-MCNC: 17 MG/DL (ref 8–27)
BUN/CREAT SERPL: 16 (ref 10–24)
CALCIUM SERPL-MCNC: 9.4 MG/DL (ref 8.6–10.2)
CHLORIDE SERPL-SCNC: 106 MMOL/L (ref 96–106)
CHOLEST SERPL-MCNC: 144 MG/DL (ref 100–199)
CHOLEST/HDLC SERPL: 2.7 RATIO (ref 0–5)
CO2 SERPL-SCNC: 23 MMOL/L (ref 20–29)
CREAT SERPL-MCNC: 1.06 MG/DL (ref 0.76–1.27)
EOSINOPHIL # BLD AUTO: 0.2 X10E3/UL (ref 0–0.4)
EOSINOPHIL NFR BLD AUTO: 3 %
ERYTHROCYTE [DISTWIDTH] IN BLOOD BY AUTOMATED COUNT: 15.5 % (ref 12.3–15.4)
GLOBULIN SER CALC-MCNC: 2.4 G/DL (ref 1.5–4.5)
GLUCOSE SERPL-MCNC: 92 MG/DL (ref 65–99)
HCT VFR BLD AUTO: 42.9 % (ref 37.5–51)
HDLC SERPL-MCNC: 53 MG/DL
HGB BLD-MCNC: 14.4 G/DL (ref 13–17.7)
IMM GRANULOCYTES # BLD AUTO: 0 X10E3/UL (ref 0–0.1)
IMM GRANULOCYTES NFR BLD AUTO: 0 %
LDLC SERPL CALC-MCNC: 71 MG/DL (ref 0–99)
LYMPHOCYTES # BLD AUTO: 1.2 X10E3/UL (ref 0.7–3.1)
LYMPHOCYTES NFR BLD AUTO: 17 %
MCH RBC QN AUTO: 29.7 PG (ref 26.6–33)
MCHC RBC AUTO-ENTMCNC: 33.6 G/DL (ref 31.5–35.7)
MCV RBC AUTO: 89 FL (ref 79–97)
MONOCYTES # BLD AUTO: 1.1 X10E3/UL (ref 0.1–0.9)
MONOCYTES NFR BLD AUTO: 15 %
NEUTROPHILS # BLD AUTO: 4.7 X10E3/UL (ref 1.4–7)
NEUTROPHILS NFR BLD AUTO: 65 %
PLATELET # BLD AUTO: 125 X10E3/UL (ref 150–450)
POTASSIUM SERPL-SCNC: 4.7 MMOL/L (ref 3.5–5.2)
PROT SERPL-MCNC: 6.6 G/DL (ref 6–8.5)
RBC # BLD AUTO: 4.85 X10E6/UL (ref 4.14–5.8)
SODIUM SERPL-SCNC: 143 MMOL/L (ref 134–144)
TRIGL SERPL-MCNC: 101 MG/DL (ref 0–149)
TSH SERPL DL<=0.005 MIU/L-ACNC: 1.85 UIU/ML (ref 0.45–4.5)
VLDLC SERPL CALC-MCNC: 20 MG/DL (ref 5–40)
WBC # BLD AUTO: 7.2 X10E3/UL (ref 3.4–10.8)

## 2019-08-05 ENCOUNTER — TELEPHONE (OUTPATIENT)
Dept: FAMILY MEDICINE CLINIC | Facility: CLINIC | Age: 84
End: 2019-08-05

## 2019-08-05 NOTE — TELEPHONE ENCOUNTER
----- Message from Zeke Cage MD sent at 8/3/2019  8:02 AM EDT -----  Let him know his blood work overall looks good, cholesterol is normal, blood count looks good, his platelets are mildly low but not a low enough to cause him any problems, this could return to normal on its own, plan to recheck labs in 1 year, thanks

## 2019-08-26 ENCOUNTER — CLINICAL SUPPORT (OUTPATIENT)
Dept: FAMILY MEDICINE CLINIC | Facility: CLINIC | Age: 84
End: 2019-08-26

## 2019-08-26 VITALS
OXYGEN SATURATION: 98 % | HEIGHT: 70 IN | SYSTOLIC BLOOD PRESSURE: 120 MMHG | TEMPERATURE: 96.6 F | BODY MASS INDEX: 25.43 KG/M2 | DIASTOLIC BLOOD PRESSURE: 86 MMHG | WEIGHT: 177.6 LBS | RESPIRATION RATE: 20 BRPM | HEART RATE: 62 BPM

## 2019-08-26 DIAGNOSIS — I48.91 ATRIAL FIBRILLATION, UNSPECIFIED TYPE (HCC): Primary | ICD-10-CM

## 2019-08-26 LAB
INR PPP: 2.7 (ref 0.9–1.1)
INR PPP: 2.7 (ref 0.9–1.1)
PROTHROMBIN TIME: 30.5 SECONDS

## 2019-08-26 PROCEDURE — 85610 PROTHROMBIN TIME: CPT | Performed by: FAMILY MEDICINE

## 2019-09-27 ENCOUNTER — CLINICAL SUPPORT (OUTPATIENT)
Dept: FAMILY MEDICINE CLINIC | Facility: CLINIC | Age: 84
End: 2019-09-27

## 2019-09-27 VITALS
WEIGHT: 177.4 LBS | BODY MASS INDEX: 25.4 KG/M2 | RESPIRATION RATE: 20 BRPM | SYSTOLIC BLOOD PRESSURE: 166 MMHG | HEIGHT: 70 IN | DIASTOLIC BLOOD PRESSURE: 79 MMHG | HEART RATE: 60 BPM | OXYGEN SATURATION: 98 % | TEMPERATURE: 97.5 F

## 2019-09-27 DIAGNOSIS — Z95.2 HISTORY OF MITRAL VALVE REPLACEMENT: ICD-10-CM

## 2019-09-27 DIAGNOSIS — I48.91 ATRIAL FIBRILLATION, UNSPECIFIED TYPE (HCC): Primary | ICD-10-CM

## 2019-09-27 LAB
INR PPP: 2.6 (ref 0.9–1.1)
INR PPP: 2.6 (ref 0.9–1.1)
PROTHROMBIN TIME: 30 SECONDS

## 2019-09-27 PROCEDURE — 85610 PROTHROMBIN TIME: CPT | Performed by: FAMILY MEDICINE

## 2019-09-27 PROCEDURE — 36416 COLLJ CAPILLARY BLOOD SPEC: CPT | Performed by: FAMILY MEDICINE

## 2019-09-27 RX ORDER — WARFARIN SODIUM 3 MG/1
3 TABLET ORAL EVERY 24 HOURS
Qty: 45 TABLET | Refills: 1 | Status: CANCELLED | OUTPATIENT
Start: 2019-09-27

## 2019-09-28 DIAGNOSIS — Z95.2 HISTORY OF MITRAL VALVE REPLACEMENT: ICD-10-CM

## 2019-09-30 RX ORDER — WARFARIN SODIUM 3 MG/1
TABLET ORAL
Qty: 45 TABLET | Refills: 1 | Status: SHIPPED | OUTPATIENT
Start: 2019-09-30 | End: 2020-09-25

## 2019-09-30 RX ORDER — WARFARIN SODIUM 3 MG/1
3 TABLET ORAL EVERY 24 HOURS
Qty: 45 TABLET | Refills: 1 | Status: SHIPPED | OUTPATIENT
Start: 2019-09-30 | End: 2019-10-01 | Stop reason: SDUPTHER

## 2019-10-01 DIAGNOSIS — Z95.2 HISTORY OF MITRAL VALVE REPLACEMENT: ICD-10-CM

## 2019-10-01 RX ORDER — WARFARIN SODIUM 3 MG/1
3 TABLET ORAL EVERY 24 HOURS
Qty: 45 TABLET | Refills: 1 | Status: SHIPPED | OUTPATIENT
Start: 2019-10-01 | End: 2020-02-26 | Stop reason: SDUPTHER

## 2019-10-01 NOTE — TELEPHONE ENCOUNTER
He is trying to get his warfarin 3 mg filled. He stated that [facility] has never received. Can we get that resent and call to let him know when we sent it?

## 2019-10-21 ENCOUNTER — CLINICAL SUPPORT NO REQUIREMENTS (OUTPATIENT)
Dept: CARDIOLOGY | Facility: CLINIC | Age: 84
End: 2019-10-21

## 2019-10-21 DIAGNOSIS — Z95.0 PACEMAKER: ICD-10-CM

## 2019-10-21 DIAGNOSIS — R00.1 BRADYCARDIA: Primary | ICD-10-CM

## 2019-10-21 PROCEDURE — 93294 REM INTERROG EVL PM/LDLS PM: CPT | Performed by: INTERNAL MEDICINE

## 2019-10-21 PROCEDURE — 93296 REM INTERROG EVL PM/IDS: CPT | Performed by: INTERNAL MEDICINE

## 2019-10-28 ENCOUNTER — CLINICAL SUPPORT (OUTPATIENT)
Dept: FAMILY MEDICINE CLINIC | Facility: CLINIC | Age: 84
End: 2019-10-28

## 2019-10-28 VITALS
WEIGHT: 180 LBS | DIASTOLIC BLOOD PRESSURE: 82 MMHG | HEIGHT: 70 IN | OXYGEN SATURATION: 99 % | RESPIRATION RATE: 20 BRPM | SYSTOLIC BLOOD PRESSURE: 140 MMHG | HEART RATE: 68 BPM | TEMPERATURE: 96.6 F | BODY MASS INDEX: 25.77 KG/M2

## 2019-10-28 DIAGNOSIS — I48.91 ATRIAL FIBRILLATION, UNSPECIFIED TYPE (HCC): Primary | ICD-10-CM

## 2019-10-28 LAB
INR PPP: 2.5 (ref 0.9–1.1)
INR PPP: 2.5 (ref 0.9–1.1)
PROTHROMBIN TIME: 28.7 SECONDS

## 2019-10-28 PROCEDURE — 85610 PROTHROMBIN TIME: CPT | Performed by: FAMILY MEDICINE

## 2019-10-28 PROCEDURE — 93793 ANTICOAG MGMT PT WARFARIN: CPT | Performed by: FAMILY MEDICINE

## 2019-10-28 PROCEDURE — 36416 COLLJ CAPILLARY BLOOD SPEC: CPT | Performed by: FAMILY MEDICINE

## 2019-11-27 ENCOUNTER — CLINICAL SUPPORT (OUTPATIENT)
Dept: FAMILY MEDICINE CLINIC | Facility: CLINIC | Age: 84
End: 2019-11-27

## 2019-11-27 VITALS
SYSTOLIC BLOOD PRESSURE: 141 MMHG | HEART RATE: 61 BPM | DIASTOLIC BLOOD PRESSURE: 79 MMHG | HEIGHT: 70 IN | OXYGEN SATURATION: 97 % | TEMPERATURE: 97.6 F | BODY MASS INDEX: 25.97 KG/M2 | WEIGHT: 181.4 LBS | RESPIRATION RATE: 20 BRPM

## 2019-11-27 DIAGNOSIS — I48.91 ATRIAL FIBRILLATION, UNSPECIFIED TYPE (HCC): Primary | ICD-10-CM

## 2019-11-27 LAB
INR PPP: 2.5 (ref 0.9–1.1)
INR PPP: 2.5 (ref 0.9–1.1)
PROTHROMBIN TIME: 29.2 SECONDS

## 2019-11-27 PROCEDURE — 85610 PROTHROMBIN TIME: CPT | Performed by: FAMILY MEDICINE

## 2019-12-27 ENCOUNTER — CLINICAL SUPPORT (OUTPATIENT)
Dept: FAMILY MEDICINE CLINIC | Facility: CLINIC | Age: 84
End: 2019-12-27

## 2019-12-27 VITALS
HEIGHT: 70 IN | HEART RATE: 62 BPM | OXYGEN SATURATION: 98 % | RESPIRATION RATE: 17 BRPM | SYSTOLIC BLOOD PRESSURE: 140 MMHG | TEMPERATURE: 98.2 F | DIASTOLIC BLOOD PRESSURE: 70 MMHG | WEIGHT: 183.4 LBS | BODY MASS INDEX: 26.26 KG/M2

## 2019-12-27 DIAGNOSIS — Z95.2 HISTORY OF HEART VALVE REPLACEMENT: Primary | ICD-10-CM

## 2019-12-27 LAB — INR PPP: 2.6 (ref 0.9–1.1)

## 2019-12-27 PROCEDURE — 85610 PROTHROMBIN TIME: CPT | Performed by: FAMILY MEDICINE

## 2020-01-22 ENCOUNTER — CLINICAL SUPPORT NO REQUIREMENTS (OUTPATIENT)
Dept: CARDIOLOGY | Facility: CLINIC | Age: 85
End: 2020-01-22

## 2020-01-22 DIAGNOSIS — Z95.0 PACEMAKER: ICD-10-CM

## 2020-01-22 DIAGNOSIS — R00.1 BRADYCARDIA: Primary | ICD-10-CM

## 2020-01-22 PROCEDURE — 93294 REM INTERROG EVL PM/LDLS PM: CPT | Performed by: INTERNAL MEDICINE

## 2020-01-22 PROCEDURE — 93296 REM INTERROG EVL PM/IDS: CPT | Performed by: INTERNAL MEDICINE

## 2020-01-27 ENCOUNTER — CLINICAL SUPPORT (OUTPATIENT)
Dept: FAMILY MEDICINE CLINIC | Facility: CLINIC | Age: 85
End: 2020-01-27

## 2020-01-27 VITALS
HEART RATE: 60 BPM | RESPIRATION RATE: 20 BRPM | TEMPERATURE: 97.5 F | BODY MASS INDEX: 26.08 KG/M2 | OXYGEN SATURATION: 98 % | HEIGHT: 70 IN | SYSTOLIC BLOOD PRESSURE: 170 MMHG | DIASTOLIC BLOOD PRESSURE: 98 MMHG | WEIGHT: 182.2 LBS

## 2020-01-27 DIAGNOSIS — I48.91 ATRIAL FIBRILLATION, UNSPECIFIED TYPE (HCC): Primary | ICD-10-CM

## 2020-01-27 LAB — INR PPP: 2.6 (ref 0.9–1.1)

## 2020-01-27 PROCEDURE — 85610 PROTHROMBIN TIME: CPT | Performed by: FAMILY MEDICINE

## 2020-01-27 PROCEDURE — 36416 COLLJ CAPILLARY BLOOD SPEC: CPT | Performed by: FAMILY MEDICINE

## 2020-01-27 PROCEDURE — 93793 ANTICOAG MGMT PT WARFARIN: CPT | Performed by: FAMILY MEDICINE

## 2020-01-27 NOTE — PROGRESS NOTES
Coumadin Management:  Jean Pierre Lazo is a 89 y.o. male who presents for coumadin management. He is on anticoagulation due to atrial fibrillation.     The goal INR for this patient is 2.0-3.0. The patient has their INR checked 1 times per month    Last INR: 2.6  Date: 12/272019  INR: 2.8     Management changes made at the last visit include none. Symptoms included NONE.         Today's INR:  INR   Date Value Ref Range Status   01/27/2020 2.60 (A) 0.9 - 1.1 Final       Current INR dose is 3.5MG MON, 3MG ALL OTHER DAYS. Recent dosing change include unchanged. Repeat INR in 1 MONTH.    By report, Jean Pierre is compliant all of the time.

## 2020-02-05 ENCOUNTER — OFFICE VISIT (OUTPATIENT)
Dept: CARDIOLOGY | Facility: CLINIC | Age: 85
End: 2020-02-05

## 2020-02-05 ENCOUNTER — CLINICAL SUPPORT NO REQUIREMENTS (OUTPATIENT)
Dept: CARDIOLOGY | Facility: CLINIC | Age: 85
End: 2020-02-05

## 2020-02-05 VITALS
SYSTOLIC BLOOD PRESSURE: 178 MMHG | BODY MASS INDEX: 26.34 KG/M2 | DIASTOLIC BLOOD PRESSURE: 96 MMHG | HEIGHT: 70 IN | WEIGHT: 184 LBS | OXYGEN SATURATION: 98 % | HEART RATE: 57 BPM

## 2020-02-05 DIAGNOSIS — I05.9 MITRAL VALVE DISEASE: ICD-10-CM

## 2020-02-05 DIAGNOSIS — E78.00 PURE HYPERCHOLESTEROLEMIA: ICD-10-CM

## 2020-02-05 DIAGNOSIS — I25.118 CORONARY ARTERY DISEASE OF NATIVE ARTERY OF NATIVE HEART WITH STABLE ANGINA PECTORIS (HCC): ICD-10-CM

## 2020-02-05 DIAGNOSIS — I48.91 ATRIAL FIBRILLATION, UNSPECIFIED TYPE (HCC): Primary | ICD-10-CM

## 2020-02-05 DIAGNOSIS — Z95.0 PRESENCE OF CARDIAC PACEMAKER: ICD-10-CM

## 2020-02-05 DIAGNOSIS — I63.9 CEREBROVASCULAR ACCIDENT (CVA), UNSPECIFIED MECHANISM (HCC): ICD-10-CM

## 2020-02-05 DIAGNOSIS — I10 ESSENTIAL HYPERTENSION: ICD-10-CM

## 2020-02-05 DIAGNOSIS — I44.30 ATRIOVENTRICULAR BLOCK: ICD-10-CM

## 2020-02-05 DIAGNOSIS — I48.21 PERMANENT ATRIAL FIBRILLATION (HCC): Primary | ICD-10-CM

## 2020-02-05 PROCEDURE — 99214 OFFICE O/P EST MOD 30 MIN: CPT | Performed by: INTERNAL MEDICINE

## 2020-02-05 PROCEDURE — 93279 PRGRMG DEV EVAL PM/LDLS PM: CPT | Performed by: INTERNAL MEDICINE

## 2020-02-05 NOTE — PROGRESS NOTES
"    Subjective:     Encounter Date:02/05/2020      Patient ID: Jean Pierre Lazo is a 89 y.o. male.    Chief Complaint:  History of Present Illness 89-year-old white male with history of coronary disease history of mitral valve disorder history of atrial fibrillation decompression of shortness post pacemaker placement hypertension hyperlipidemia presents to my office for follow-up.  Patient is currently stable without symptoms of chest pain or shortness of breath at rest or exertion.  No complains of any PND orthopnea.  No palpitation dizziness syncope or swelling of the feet.  He is taking his meds regularly.  He however developed cough and is on lisinopril.  He does not smoke.    The following portions of the patient's history were reviewed and updated as appropriate: allergies, current medications, past family history, past medical history, past social history, past surgical history and problem list.  Past Medical History:   Diagnosis Date   • Atrial fibrillation (CMS/HCC)    • Coronary artery disease    • History of heart valve replacement    • Hyperlipidemia    • Hypertension      Past Surgical History:   Procedure Laterality Date   • CARDIAC CATHETERIZATION     • CARDIAC VALVE SURGERY     • CHOLECYSTECTOMY     • PACEMAKER IMPLANTATION       /96 (BP Location: Right arm, Patient Position: Sitting)   Pulse 57   Ht 177.8 cm (70\")   Wt 83.5 kg (184 lb)   SpO2 98%   BMI 26.40 kg/m²   History reviewed. No pertinent family history.    Current Outpatient Medications:   •  lisinopril (PRINIVIL,ZESTRIL) 10 MG tablet, Take 10 mg by mouth Daily., Disp: , Rfl: 12  •  lovastatin (MEVACOR) 20 MG tablet, Take 40 mg by mouth Daily., Disp: , Rfl: 3  •  metoprolol tartrate (LOPRESSOR) 25 MG tablet, METOPROLOL TARTRATE 25 MG TABS, Disp: , Rfl:   •  Multiple Vitamins-Minerals (MULTI VITAMIN/MINERALS) tablet, MULTI VITAMIN/MINERALS TABS, Disp: , Rfl:   •  warfarin (COUMADIN) 3 MG tablet, TAKE 1 TABLET BY MOUTH ONCE DAILY " (Patient taking differently: 3.5 mg. Take 3.5 mg on Monday), Disp: 45 tablet, Rfl: 1  •  warfarin (COUMADIN) 3 MG tablet, Take 1 tablet by mouth Daily., Disp: 45 tablet, Rfl: 1  No Known Allergies  Social History     Socioeconomic History   • Marital status:      Spouse name: Not on file   • Number of children: Not on file   • Years of education: Not on file   • Highest education level: Not on file   Tobacco Use   • Smoking status: Never Smoker   • Smokeless tobacco: Never Used   Substance and Sexual Activity   • Alcohol use: No     Frequency: Never   • Drug use: No   • Sexual activity: Defer     Review of Systems   Constitution: Negative for fever and malaise/fatigue.   HENT: Negative for ear pain and nosebleeds.    Eyes: Negative for blurred vision and double vision.   Cardiovascular: Negative for chest pain, dyspnea on exertion and palpitations.   Respiratory: Positive for cough. Negative for shortness of breath.    Skin: Negative for rash.   Musculoskeletal: Negative for joint pain.   Gastrointestinal: Negative for abdominal pain, nausea and vomiting.   Neurological: Negative for dizziness, focal weakness, headaches and light-headedness.   Psychiatric/Behavioral: Negative for depression. The patient is not nervous/anxious.    All other systems reviewed and are negative.             Objective:     Physical Exam   Constitutional: He appears well-developed and well-nourished.   HENT:   Head: Normocephalic and atraumatic.   Eyes: Pupils are equal, round, and reactive to light. Conjunctivae and EOM are normal. No scleral icterus.   Neck: Normal range of motion. Neck supple. No JVD present. Carotid bruit is not present.   Cardiovascular: Normal rate, regular rhythm, S1 normal, S2 normal and intact distal pulses. PMI is not displaced.   Murmur heard.  Pulmonary/Chest: Effort normal and breath sounds normal. He has no wheezes. He has no rales.   Abdominal: Soft. Bowel sounds are normal.   Musculoskeletal: Normal  range of motion.   Neurological: He is alert. He has normal strength.   No focal deficits   Skin: Skin is warm and dry. No rash noted.   Psychiatric: He has a normal mood and affect.     Procedures    Lab Review:       Assessment:          Diagnosis Plan   1. Permanent atrial fibrillation     2. Coronary artery disease of native artery of native heart with stable angina pectoris (CMS/HCC)     3. Presence of cardiac pacemaker     4. Essential hypertension     5. Pure hypercholesterolemia     6. Cerebrovascular accident (CVA), unspecified mechanism (CMS/HCC)     7. Mitral valve disease            Plan:       Patient has history of coronary disease and is status post coronary bypass surgery x3 vessels with a LIMA to LAD and saphenous graft to the marginal branch into the RCA  Patient has normal LV function is currently stable on medications  Patient has history of atrial fibrillation and is currently on warfarin for anticoagulation and keeps his INR between 2.0-3.0  Patient also has tachybradycardia syndrome and is status post permanent pacemaker placement  Patient's pacemaker is working very well  Patient also has mitral valve disorder and he will have an echocardiogram  Patient's blood pressure and heart rate stable  Patient's lipid levels are followed by the primary care doctor.

## 2020-02-26 ENCOUNTER — OFFICE VISIT (OUTPATIENT)
Dept: FAMILY MEDICINE CLINIC | Facility: CLINIC | Age: 85
End: 2020-02-26

## 2020-02-26 VITALS
SYSTOLIC BLOOD PRESSURE: 118 MMHG | HEIGHT: 70 IN | WEIGHT: 183.4 LBS | BODY MASS INDEX: 26.26 KG/M2 | TEMPERATURE: 98.2 F | RESPIRATION RATE: 16 BRPM | DIASTOLIC BLOOD PRESSURE: 82 MMHG | OXYGEN SATURATION: 98 % | HEART RATE: 63 BPM

## 2020-02-26 DIAGNOSIS — R53.83 MALAISE AND FATIGUE: Primary | ICD-10-CM

## 2020-02-26 DIAGNOSIS — Z95.2 HISTORY OF MITRAL VALVE REPLACEMENT: ICD-10-CM

## 2020-02-26 DIAGNOSIS — I10 ESSENTIAL HYPERTENSION: ICD-10-CM

## 2020-02-26 DIAGNOSIS — E78.2 MIXED HYPERLIPIDEMIA: ICD-10-CM

## 2020-02-26 DIAGNOSIS — R53.81 MALAISE AND FATIGUE: Primary | ICD-10-CM

## 2020-02-26 LAB — INR PPP: 3.5 (ref 0.9–1.1)

## 2020-02-26 PROCEDURE — 85610 PROTHROMBIN TIME: CPT | Performed by: FAMILY MEDICINE

## 2020-02-26 PROCEDURE — 36416 COLLJ CAPILLARY BLOOD SPEC: CPT | Performed by: FAMILY MEDICINE

## 2020-02-26 PROCEDURE — 99214 OFFICE O/P EST MOD 30 MIN: CPT | Performed by: FAMILY MEDICINE

## 2020-02-26 RX ORDER — HYDROCHLOROTHIAZIDE 12.5 MG/1
12.5 TABLET ORAL DAILY
Qty: 90 TABLET | Refills: 3 | Status: SHIPPED | OUTPATIENT
Start: 2020-02-26 | End: 2021-03-12

## 2020-02-26 RX ORDER — WARFARIN SODIUM 5 MG/1
5 TABLET ORAL
COMMUNITY
End: 2020-02-26

## 2020-02-26 RX ORDER — LOVASTATIN 20 MG/1
40 TABLET ORAL DAILY
Qty: 90 TABLET | Refills: 3 | Status: SHIPPED | OUTPATIENT
Start: 2020-02-26 | End: 2020-12-07

## 2020-02-26 RX ORDER — WARFARIN SODIUM 3 MG/1
3 TABLET ORAL EVERY 24 HOURS
Qty: 45 TABLET | Refills: 1 | Status: SHIPPED | OUTPATIENT
Start: 2020-02-26 | End: 2020-08-19

## 2020-02-26 RX ORDER — LOSARTAN POTASSIUM AND HYDROCHLOROTHIAZIDE 12.5; 1 MG/1; MG/1
1 TABLET ORAL DAILY
COMMUNITY
Start: 2020-02-07 | End: 2020-02-26

## 2020-02-26 RX ORDER — LOSARTAN POTASSIUM 100 MG/1
100 TABLET ORAL DAILY
Qty: 90 TABLET | Refills: 3 | Status: SHIPPED | OUTPATIENT
Start: 2020-02-26 | End: 2021-04-09 | Stop reason: SDUPTHER

## 2020-03-25 ENCOUNTER — CLINICAL SUPPORT (OUTPATIENT)
Dept: FAMILY MEDICINE CLINIC | Facility: CLINIC | Age: 85
End: 2020-03-25

## 2020-03-25 VITALS
RESPIRATION RATE: 18 BRPM | TEMPERATURE: 96 F | HEART RATE: 64 BPM | HEIGHT: 70 IN | BODY MASS INDEX: 26.48 KG/M2 | SYSTOLIC BLOOD PRESSURE: 138 MMHG | OXYGEN SATURATION: 100 % | WEIGHT: 185 LBS | DIASTOLIC BLOOD PRESSURE: 80 MMHG

## 2020-03-25 DIAGNOSIS — Z95.2 HISTORY OF HEART VALVE REPLACEMENT: Primary | ICD-10-CM

## 2020-03-25 LAB — INR PPP: 2.9 (ref 2.5–3.5)

## 2020-03-25 PROCEDURE — 85610 PROTHROMBIN TIME: CPT | Performed by: FAMILY MEDICINE

## 2020-03-25 PROCEDURE — 93793 ANTICOAG MGMT PT WARFARIN: CPT | Performed by: FAMILY MEDICINE

## 2020-03-25 PROCEDURE — 36415 COLL VENOUS BLD VENIPUNCTURE: CPT | Performed by: FAMILY MEDICINE

## 2020-03-25 NOTE — PROGRESS NOTES
Subjective   Jean Pierre Lazo is a 89 y.o. male.     Chief Complaint   Patient presents with   • Anticoagulation       Coumadin Management:  Jean Pierre Lazo is a 89 y.o. male who presents for coumadin management. He is on anticoagulation due to mechanical aortic valve.     The goal INR for this patient is 2.5-3.5. The patient has their INR checked 1 times per month    Last INR:  Date: 02/26/2020  INR: 3.50     Management changes made at the last visit include changing to dose of 3mg daily. Symptoms included none.    Patient Findings     Negatives:   Signs/symptoms of thrombosis, Signs/symptoms of bleeding,   Laboratory test error suspected, Change in health, Change in alcohol use,   Change in activity, Upcoming invasive procedure, Emergency department   visit, Upcoming dental procedure, Missed doses, Extra doses, Change in   medications, Change in diet/appetite, Hospital admission, Bruising, Other   complaints      Clinical Outcomes     Negatives:   Major bleeding event, Thromboembolic event,   Anticoagulation-related hospital admission, Anticoagulation-related ED   visit, Anticoagulation-related fatality         Today's INR:  INR   Date Value Ref Range Status   03/25/2020 2.90 (A) 2.5 - 3.5 Corrected       Current INR dose is warfarin 3 mg PO daily. Recent dosing change include unchanged. Repeat INR in 1 month.    By report, Jean Pierre is compliant all of the time.    History of Present Illness     The following portions of the patient's history were reviewed and updated as appropriate: allergies, current medications, past family history, past medical history, past social history, past surgical history and problem list.    Allergies:  No Known Allergies    Social History:  Social History     Socioeconomic History   • Marital status:      Spouse name: Not on file   • Number of children: Not on file   • Years of education: Not on file   • Highest education level: Not on file   Tobacco Use   • Smoking status: Never  Smoker   • Smokeless tobacco: Never Used   Substance and Sexual Activity   • Alcohol use: No     Frequency: Never   • Drug use: No   • Sexual activity: Defer       Family History:  History reviewed. No pertinent family history.    Past Medical History :  Patient Active Problem List   Diagnosis   • Atrial fibrillation (CMS/HCC)   • Atrioventricular block   • Cerebrovascular accident (CVA) (CMS/HCC)   • Cholelithiasis with cholecystitis   • Coronary artery disease   • History of heart valve replacement   • Hyperlipidemia   • Hypertension   • Mitral valve disease   • Presence of aortocoronary bypass graft   • Presence of cardiac pacemaker   • Medicare annual wellness visit, subsequent   • Bilateral impacted cerumen       Medication List:  Outpatient Encounter Medications as of 3/25/2020   Medication Sig Dispense Refill   • hydroCHLOROthiazide (HYDRODIURIL) 12.5 MG tablet Take 1 tablet by mouth Daily. 90 tablet 3   • losartan (COZAAR) 100 MG tablet Take 1 tablet by mouth Daily for 30 days. 90 tablet 3   • lovastatin (MEVACOR) 20 MG tablet Take 2 tablets by mouth Daily. 90 tablet 3   • metoprolol tartrate (LOPRESSOR) 25 MG tablet Take 1 tablet by mouth 2 (Two) Times a Day. 180 tablet 2   • Multiple Vitamins-Minerals (MULTI VITAMIN/MINERALS) tablet MULTI VITAMIN/MINERALS TABS     • warfarin (COUMADIN) 3 MG tablet Take 1 tablet by mouth Daily. 45 tablet 1   • warfarin (COUMADIN) 3 MG tablet TAKE 1 TABLET BY MOUTH ONCE DAILY (Patient taking differently: 5 mg. Take 3.5 mg on Monday) 45 tablet 1     No facility-administered encounter medications on file as of 3/25/2020.        Past Surgical History:  Past Surgical History:   Procedure Laterality Date   • CARDIAC CATHETERIZATION     • CARDIAC VALVE SURGERY     • CHOLECYSTECTOMY     • PACEMAKER IMPLANTATION         Review of Systems:  Review of Systems   Skin: Negative for bruise.       I have reviewed and confirmed the accuracy of the ROS as documented by the MA/LPN/RN Inga  "LINDA Ramos MA    Vital Signs:  Visit Vitals  /80 (BP Location: Right arm, Patient Position: Sitting, Cuff Size: Adult)   Pulse 64   Temp 96 °F (35.6 °C) (Oral)   Resp 18   Ht 177.8 cm (70\")   Wt 83.9 kg (185 lb)   SpO2 100% Comment: Room air   BMI 26.54 kg/m²       Assessment and Plan:  Problem List Items Addressed This Visit        Unprioritized    History of heart valve replacement - Primary    Overview     Doing well  Benefit coumadin outweighs risk currently  follwoed by cardiology         Relevant Orders    POC INR (Completed)          An After Visit Summary and PPPS were given to the patient.       Site care done- cleaned with alcohol swab, procedure tolerated well, dressing applied. Venipuncture was obtained after 1 time(s). 2 tubes were drawn. Needle rae used was 23-butterfly.    "

## 2020-03-26 LAB
BASOPHILS # BLD AUTO: 0.1 X10E3/UL (ref 0–0.2)
BASOPHILS NFR BLD AUTO: 1 %
BUN SERPL-MCNC: 24 MG/DL (ref 8–27)
BUN/CREAT SERPL: 20 (ref 10–24)
CALCIUM SERPL-MCNC: 9.4 MG/DL (ref 8.6–10.2)
CHLORIDE SERPL-SCNC: 103 MMOL/L (ref 96–106)
CO2 SERPL-SCNC: 22 MMOL/L (ref 20–29)
CREAT SERPL-MCNC: 1.18 MG/DL (ref 0.76–1.27)
EOSINOPHIL # BLD AUTO: 0.2 X10E3/UL (ref 0–0.4)
EOSINOPHIL NFR BLD AUTO: 3 %
ERYTHROCYTE [DISTWIDTH] IN BLOOD BY AUTOMATED COUNT: 13.7 % (ref 11.6–15.4)
GLUCOSE SERPL-MCNC: ABNORMAL MG/DL
HCT VFR BLD AUTO: 40.6 % (ref 37.5–51)
HGB BLD-MCNC: 13.9 G/DL (ref 13–17.7)
IMM GRANULOCYTES # BLD AUTO: 0 X10E3/UL (ref 0–0.1)
IMM GRANULOCYTES NFR BLD AUTO: 0 %
LYMPHOCYTES # BLD AUTO: 1.4 X10E3/UL (ref 0.7–3.1)
LYMPHOCYTES NFR BLD AUTO: 20 %
MCH RBC QN AUTO: 31.2 PG (ref 26.6–33)
MCHC RBC AUTO-ENTMCNC: 34.2 G/DL (ref 31.5–35.7)
MCV RBC AUTO: 91 FL (ref 79–97)
MONOCYTES # BLD AUTO: 1 X10E3/UL (ref 0.1–0.9)
MONOCYTES NFR BLD AUTO: 15 %
NEUTROPHILS # BLD AUTO: 4.2 X10E3/UL (ref 1.4–7)
NEUTROPHILS NFR BLD AUTO: 61 %
PLATELET # BLD AUTO: 132 X10E3/UL (ref 150–450)
POTASSIUM SERPL-SCNC: ABNORMAL MMOL/L
RBC # BLD AUTO: 4.46 X10E6/UL (ref 4.14–5.8)
SODIUM SERPL-SCNC: 144 MMOL/L (ref 134–144)
WBC # BLD AUTO: 6.9 X10E3/UL (ref 3.4–10.8)

## 2020-03-27 ENCOUNTER — TELEPHONE (OUTPATIENT)
Dept: FAMILY MEDICINE CLINIC | Facility: CLINIC | Age: 85
End: 2020-03-27

## 2020-03-27 NOTE — TELEPHONE ENCOUNTER
----- Message from Zeke Cage MD sent at 3/27/2020  7:47 AM EDT -----  Let him know his blood work overall looks good, his blood counts, kidney function are normal, he does have a mildly low platelet level, this has improved some from the last check, not low enough to cause him to have any bleeding, we will plan to check this again at his regular physical in 6 months, if he has any signs of bleeding he should let us know, if his low platelet level persists or worsens we will consider evaluation by hematology, thanks

## 2020-04-22 ENCOUNTER — CLINICAL SUPPORT (OUTPATIENT)
Dept: FAMILY MEDICINE CLINIC | Facility: CLINIC | Age: 85
End: 2020-04-22

## 2020-04-22 VITALS
SYSTOLIC BLOOD PRESSURE: 128 MMHG | HEART RATE: 68 BPM | TEMPERATURE: 97.2 F | HEIGHT: 70 IN | RESPIRATION RATE: 18 BRPM | DIASTOLIC BLOOD PRESSURE: 82 MMHG | OXYGEN SATURATION: 98 % | WEIGHT: 183.2 LBS | BODY MASS INDEX: 26.23 KG/M2

## 2020-04-22 DIAGNOSIS — I10 ESSENTIAL HYPERTENSION: ICD-10-CM

## 2020-04-22 DIAGNOSIS — I48.21 PERMANENT ATRIAL FIBRILLATION (HCC): ICD-10-CM

## 2020-04-22 LAB — INR PPP: 3.1 (ref 2–3)

## 2020-04-22 PROCEDURE — 36416 COLLJ CAPILLARY BLOOD SPEC: CPT | Performed by: FAMILY MEDICINE

## 2020-04-22 PROCEDURE — 85610 PROTHROMBIN TIME: CPT | Performed by: FAMILY MEDICINE

## 2020-04-22 PROCEDURE — 93793 ANTICOAG MGMT PT WARFARIN: CPT | Performed by: FAMILY MEDICINE

## 2020-04-22 RX ORDER — LOSARTAN POTASSIUM 100 MG/1
100 TABLET ORAL DAILY
COMMUNITY
Start: 2020-04-01 | End: 2021-03-12

## 2020-05-11 ENCOUNTER — CLINICAL SUPPORT NO REQUIREMENTS (OUTPATIENT)
Dept: CARDIOLOGY | Facility: CLINIC | Age: 85
End: 2020-05-11

## 2020-05-11 DIAGNOSIS — I44.30 ATRIOVENTRICULAR BLOCK: ICD-10-CM

## 2020-05-11 DIAGNOSIS — Z95.0 PRESENCE OF CARDIAC PACEMAKER: ICD-10-CM

## 2020-05-11 DIAGNOSIS — I48.21 PERMANENT ATRIAL FIBRILLATION (HCC): Primary | ICD-10-CM

## 2020-05-11 PROCEDURE — 93294 REM INTERROG EVL PM/LDLS PM: CPT | Performed by: INTERNAL MEDICINE

## 2020-05-11 PROCEDURE — 93296 REM INTERROG EVL PM/IDS: CPT | Performed by: INTERNAL MEDICINE

## 2020-05-27 ENCOUNTER — CLINICAL SUPPORT (OUTPATIENT)
Dept: FAMILY MEDICINE CLINIC | Facility: CLINIC | Age: 85
End: 2020-05-27

## 2020-05-27 VITALS
OXYGEN SATURATION: 98 % | BODY MASS INDEX: 26.6 KG/M2 | SYSTOLIC BLOOD PRESSURE: 166 MMHG | TEMPERATURE: 97.8 F | WEIGHT: 185.8 LBS | HEIGHT: 70 IN | DIASTOLIC BLOOD PRESSURE: 88 MMHG | RESPIRATION RATE: 18 BRPM | HEART RATE: 63 BPM

## 2020-05-27 DIAGNOSIS — I48.21 PERMANENT ATRIAL FIBRILLATION (HCC): ICD-10-CM

## 2020-05-27 LAB — INR PPP: 2.5 (ref 2–3)

## 2020-05-27 PROCEDURE — 93793 ANTICOAG MGMT PT WARFARIN: CPT | Performed by: FAMILY MEDICINE

## 2020-05-27 PROCEDURE — 36416 COLLJ CAPILLARY BLOOD SPEC: CPT | Performed by: FAMILY MEDICINE

## 2020-05-27 PROCEDURE — 85610 PROTHROMBIN TIME: CPT | Performed by: FAMILY MEDICINE

## 2020-06-26 ENCOUNTER — CLINICAL SUPPORT (OUTPATIENT)
Dept: FAMILY MEDICINE CLINIC | Facility: CLINIC | Age: 85
End: 2020-06-26

## 2020-06-26 VITALS
WEIGHT: 182.2 LBS | BODY MASS INDEX: 26.08 KG/M2 | OXYGEN SATURATION: 98 % | SYSTOLIC BLOOD PRESSURE: 168 MMHG | RESPIRATION RATE: 16 BRPM | HEIGHT: 70 IN | DIASTOLIC BLOOD PRESSURE: 92 MMHG | HEART RATE: 65 BPM

## 2020-06-26 DIAGNOSIS — I48.21 PERMANENT ATRIAL FIBRILLATION (HCC): Primary | ICD-10-CM

## 2020-06-26 LAB — INR PPP: 3.1 (ref 2–3)

## 2020-06-26 PROCEDURE — 85610 PROTHROMBIN TIME: CPT | Performed by: FAMILY MEDICINE

## 2020-06-26 PROCEDURE — 93793 ANTICOAG MGMT PT WARFARIN: CPT | Performed by: FAMILY MEDICINE

## 2020-07-24 ENCOUNTER — CLINICAL SUPPORT (OUTPATIENT)
Dept: FAMILY MEDICINE CLINIC | Facility: CLINIC | Age: 85
End: 2020-07-24

## 2020-07-24 VITALS
BODY MASS INDEX: 26 KG/M2 | WEIGHT: 181.6 LBS | RESPIRATION RATE: 18 BRPM | HEIGHT: 70 IN | HEART RATE: 63 BPM | SYSTOLIC BLOOD PRESSURE: 157 MMHG | OXYGEN SATURATION: 97 % | TEMPERATURE: 97.6 F | DIASTOLIC BLOOD PRESSURE: 87 MMHG

## 2020-07-24 DIAGNOSIS — I48.21 PERMANENT ATRIAL FIBRILLATION (HCC): ICD-10-CM

## 2020-07-24 LAB — INR PPP: 2.9 (ref 2–3)

## 2020-07-24 PROCEDURE — 85610 PROTHROMBIN TIME: CPT | Performed by: FAMILY MEDICINE

## 2020-07-24 PROCEDURE — 36416 COLLJ CAPILLARY BLOOD SPEC: CPT | Performed by: FAMILY MEDICINE

## 2020-07-24 PROCEDURE — 93793 ANTICOAG MGMT PT WARFARIN: CPT | Performed by: FAMILY MEDICINE

## 2020-07-24 NOTE — PROGRESS NOTES
Coumadin Management:  Jean Pierre Lazo is a 89 y.o. male who presents for coumadin management. He is on anticoagulation due to atrial fibrillation.     The goal INR for this patient is 2.0-3.0. The patient has their INR checked 1 times per month    Last INR:  Date: 06/26/2020  INR: 3.10     Management changes made at the last visit include none. Symptoms included easy bruising.  Patient Findings     Positives:   Bruising    Negatives:   Signs/symptoms of thrombosis, Signs/symptoms of bleeding,   Laboratory test error suspected, Change in health, Change in alcohol use,   Change in activity, Upcoming invasive procedure, Emergency department   visit, Upcoming dental procedure, Missed doses, Extra doses, Change in   medications, Change in diet/appetite, Hospital admission, Other complaints        Clinical Outcomes     Negatives:   Major bleeding event, Thromboembolic event,   Anticoagulation-related hospital admission, Anticoagulation-related ED   visit, Anticoagulation-related fatality         Today's INR:  INR   Date Value Ref Range Status   07/24/2020 2.90 2 - 3 Final       Current INR dose is warfarin 3 mg PO daily. Recent dosing change include unchanged. Repeat INR in 1 month.    By report, Jean Pierre is compliant most of the time.

## 2020-08-19 ENCOUNTER — OFFICE VISIT (OUTPATIENT)
Dept: CARDIOLOGY | Facility: CLINIC | Age: 85
End: 2020-08-19

## 2020-08-19 ENCOUNTER — CLINICAL SUPPORT NO REQUIREMENTS (OUTPATIENT)
Dept: CARDIOLOGY | Facility: CLINIC | Age: 85
End: 2020-08-19

## 2020-08-19 VITALS
HEIGHT: 70 IN | WEIGHT: 179 LBS | BODY MASS INDEX: 25.62 KG/M2 | OXYGEN SATURATION: 97 % | SYSTOLIC BLOOD PRESSURE: 163 MMHG | HEART RATE: 60 BPM | DIASTOLIC BLOOD PRESSURE: 90 MMHG

## 2020-08-19 DIAGNOSIS — I05.9 MITRAL VALVE DISEASE: ICD-10-CM

## 2020-08-19 DIAGNOSIS — I44.30 ATRIOVENTRICULAR BLOCK: ICD-10-CM

## 2020-08-19 DIAGNOSIS — I25.10 CORONARY ARTERY DISEASE INVOLVING NATIVE CORONARY ARTERY OF NATIVE HEART WITHOUT ANGINA PECTORIS: ICD-10-CM

## 2020-08-19 DIAGNOSIS — E78.2 MIXED HYPERLIPIDEMIA: ICD-10-CM

## 2020-08-19 DIAGNOSIS — I48.21 PERMANENT ATRIAL FIBRILLATION (HCC): Primary | ICD-10-CM

## 2020-08-19 DIAGNOSIS — Z95.0 PRESENCE OF CARDIAC PACEMAKER: ICD-10-CM

## 2020-08-19 DIAGNOSIS — I10 ESSENTIAL HYPERTENSION: ICD-10-CM

## 2020-08-19 PROCEDURE — 99214 OFFICE O/P EST MOD 30 MIN: CPT | Performed by: INTERNAL MEDICINE

## 2020-08-19 PROCEDURE — 93279 PRGRMG DEV EVAL PM/LDLS PM: CPT | Performed by: INTERNAL MEDICINE

## 2020-08-19 RX ORDER — NIACIN 500 MG
500 TABLET ORAL NIGHTLY
COMMUNITY

## 2020-08-19 NOTE — PROGRESS NOTES
"    Subjective:     Encounter Date:08/19/2020      Patient ID: Jean Pierre Lazo is a 89 y.o. male.    Chief Complaint:  History of Present Illness 89-year-old white male with history of coronary status post coronary bypass surgery history of mitral valve disorder status post mitral valve replacement history of atrial fibrillation status post arrhythmia and pacemaker placement hypertension hyperlipidemia presents to my office for follow-up.  Patient is currently stable without evidence of chest pain or shortness of breath at rest on exertion.  No complains of any PND orthopnea.  No palpitation but occasional dizziness.  No syncope or swelling of the feet but does take his meds regularly but he is quite active.  He does not smoke.    The following portions of the patient's history were reviewed and updated as appropriate: allergies, current medications, past family history, past medical history, past social history, past surgical history and problem list.  Past Medical History:   Diagnosis Date   • Atrial fibrillation (CMS/HCC)    • Coronary artery disease    • History of heart valve replacement    • Hyperlipidemia    • Hypertension      Past Surgical History:   Procedure Laterality Date   • CARDIAC CATHETERIZATION     • CARDIAC VALVE SURGERY     • CHOLECYSTECTOMY     • PACEMAKER IMPLANTATION       /90 (BP Location: Left arm, Patient Position: Sitting)   Pulse 60   Ht 177.8 cm (70\")   Wt 81.2 kg (179 lb)   SpO2 97%   BMI 25.68 kg/m²   History reviewed. No pertinent family history.    Current Outpatient Medications:   •  hydroCHLOROthiazide (HYDRODIURIL) 12.5 MG tablet, Take 1 tablet by mouth Daily., Disp: 90 tablet, Rfl: 3  •  losartan (COZAAR) 100 MG tablet, Take 100 mg by mouth Daily. FOR 30 DAYS, Disp: , Rfl:   •  lovastatin (MEVACOR) 20 MG tablet, Take 2 tablets by mouth Daily., Disp: 90 tablet, Rfl: 3  •  metoprolol tartrate (LOPRESSOR) 25 MG tablet, Take 1 tablet by mouth 2 (Two) Times a Day., Disp: 180 " tablet, Rfl: 2  •  Multiple Vitamins-Minerals (MULTI VITAMIN/MINERALS) tablet, MULTI VITAMIN/MINERALS TABS, Disp: , Rfl:   •  niacin 500 MG tablet, Take 500 mg by mouth Every Night., Disp: , Rfl:   •  warfarin (COUMADIN) 3 MG tablet, TAKE 1 TABLET BY MOUTH ONCE DAILY (Patient taking differently: Take 3 mg by mouth Every Night. Take 3.5 mg on Monday), Disp: 45 tablet, Rfl: 1  No Known Allergies  Social History     Socioeconomic History   • Marital status:      Spouse name: Not on file   • Number of children: Not on file   • Years of education: Not on file   • Highest education level: Not on file   Tobacco Use   • Smoking status: Never Smoker   • Smokeless tobacco: Never Used   Substance and Sexual Activity   • Alcohol use: No     Frequency: Never   • Drug use: No   • Sexual activity: Defer     Review of Systems   Constitution: Negative for fever and malaise/fatigue.   HENT: Negative for ear pain and nosebleeds.    Eyes: Negative for blurred vision and double vision.   Cardiovascular: Negative for chest pain, dyspnea on exertion and palpitations.   Respiratory: Negative for cough and shortness of breath.    Skin: Negative for rash.   Musculoskeletal: Negative for joint pain.   Gastrointestinal: Negative for abdominal pain, nausea and vomiting.   Neurological: Positive for dizziness and light-headedness. Negative for focal weakness and headaches.   Psychiatric/Behavioral: Negative for depression. The patient is not nervous/anxious.    All other systems reviewed and are negative.             Objective:     Physical Exam   Constitutional: He appears well-developed and well-nourished.   HENT:   Head: Normocephalic and atraumatic.   Eyes: Pupils are equal, round, and reactive to light. Conjunctivae and EOM are normal. No scleral icterus.   Neck: Normal range of motion. Neck supple. No JVD present. Carotid bruit is not present.   Cardiovascular: Normal rate, regular rhythm, S1 normal, S2 normal, normal heart sounds  and intact distal pulses. PMI is not displaced.   Pulmonary/Chest: Effort normal and breath sounds normal. He has no wheezes. He has no rales.   Abdominal: Soft. Bowel sounds are normal.   Musculoskeletal: Normal range of motion.   Neurological: He is alert. He has normal strength.   No focal deficits   Skin: Skin is warm and dry. No rash noted.   Psychiatric: He has a normal mood and affect.     Procedures    Lab Review:       Assessment:          Diagnosis Plan   1. Permanent atrial fibrillation (CMS/HCC)     2. Atrioventricular block     3. Coronary artery disease involving native coronary artery of native heart without angina pectoris     4. Mixed hyperlipidemia     5. Essential hypertension     6. Mitral valve disease     7. 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  Patient's pacemaker is working very well  Patient blood pressure and heart rate are stable  Patient lipid levels are followed by the primary care doctor and is on a statin.  Continue current medicines and follow in 6 months

## 2020-08-24 ENCOUNTER — CLINICAL SUPPORT (OUTPATIENT)
Dept: FAMILY MEDICINE CLINIC | Facility: CLINIC | Age: 85
End: 2020-08-24

## 2020-08-24 VITALS
DIASTOLIC BLOOD PRESSURE: 85 MMHG | SYSTOLIC BLOOD PRESSURE: 148 MMHG | OXYGEN SATURATION: 97 % | WEIGHT: 179.4 LBS | TEMPERATURE: 97.8 F | HEIGHT: 70 IN | BODY MASS INDEX: 25.68 KG/M2 | RESPIRATION RATE: 20 BRPM | HEART RATE: 58 BPM

## 2020-08-24 DIAGNOSIS — I48.21 PERMANENT ATRIAL FIBRILLATION (HCC): Primary | ICD-10-CM

## 2020-08-24 LAB — INR PPP: 2.6 (ref 2–3)

## 2020-08-24 PROCEDURE — 85610 PROTHROMBIN TIME: CPT | Performed by: FAMILY MEDICINE

## 2020-08-24 PROCEDURE — 93793 ANTICOAG MGMT PT WARFARIN: CPT | Performed by: FAMILY MEDICINE

## 2020-08-24 NOTE — PROGRESS NOTES
Coumadin Management:  Jean Pierre Lazo is a 89 y.o. male who presents for coumadin management. He is on anticoagulation due to atrial fibrillation.     The goal INR for this patient is 2.0-3.0. The patient has their INR checked 1 times per month    Last INR:  Date: 07/24/2020  INR: 2.9     Management changes made at the last visit include none. Symptoms included easy bruising.         Today's INR:  INR   Date Value Ref Range Status   08/24/2020 2.60 (A) 2 - 3 Final       Current INR dose is warfarin 3 mg PO daily. Recent dosing change include unchanged. Repeat INR in 09/23/2020.    By report, Jean Pierre is compliant most of the time.

## 2020-09-23 ENCOUNTER — OFFICE VISIT (OUTPATIENT)
Dept: FAMILY MEDICINE CLINIC | Facility: CLINIC | Age: 85
End: 2020-09-23

## 2020-09-23 VITALS
OXYGEN SATURATION: 98 % | RESPIRATION RATE: 18 BRPM | WEIGHT: 177 LBS | BODY MASS INDEX: 25.34 KG/M2 | TEMPERATURE: 96.3 F | SYSTOLIC BLOOD PRESSURE: 131 MMHG | HEART RATE: 68 BPM | DIASTOLIC BLOOD PRESSURE: 74 MMHG | HEIGHT: 70 IN

## 2020-09-23 DIAGNOSIS — Z23 NEED FOR INFLUENZA VACCINATION: ICD-10-CM

## 2020-09-23 DIAGNOSIS — I10 ESSENTIAL HYPERTENSION: ICD-10-CM

## 2020-09-23 DIAGNOSIS — L57.0 ACTINIC KERATOSIS: ICD-10-CM

## 2020-09-23 DIAGNOSIS — Z95.0 PRESENCE OF CARDIAC PACEMAKER: ICD-10-CM

## 2020-09-23 DIAGNOSIS — Z23 NEED FOR PNEUMOCOCCAL VACCINATION: ICD-10-CM

## 2020-09-23 DIAGNOSIS — E78.2 MIXED HYPERLIPIDEMIA: ICD-10-CM

## 2020-09-23 DIAGNOSIS — Z00.00 MEDICARE ANNUAL WELLNESS VISIT, SUBSEQUENT: Primary | ICD-10-CM

## 2020-09-23 DIAGNOSIS — R30.0 DYSURIA: ICD-10-CM

## 2020-09-23 DIAGNOSIS — I48.21 PERMANENT ATRIAL FIBRILLATION (HCC): ICD-10-CM

## 2020-09-23 LAB
BILIRUB BLD-MCNC: NEGATIVE MG/DL
CLARITY, POC: CLEAR
COLOR UR: YELLOW
GLUCOSE UR STRIP-MCNC: NEGATIVE MG/DL
INR PPP: 3.7 (ref 2–3)
KETONES UR QL: NEGATIVE
LEUKOCYTE EST, POC: NEGATIVE
NITRITE UR-MCNC: NEGATIVE MG/ML
PH UR: 5 [PH] (ref 5–8)
PROT UR STRIP-MCNC: ABNORMAL MG/DL
RBC # UR STRIP: ABNORMAL /UL
SP GR UR: 1.02 (ref 1–1.03)
UROBILINOGEN UR QL: NORMAL

## 2020-09-23 PROCEDURE — G0009 ADMIN PNEUMOCOCCAL VACCINE: HCPCS | Performed by: FAMILY MEDICINE

## 2020-09-23 PROCEDURE — 90670 PCV13 VACCINE IM: CPT | Performed by: FAMILY MEDICINE

## 2020-09-23 PROCEDURE — 90694 VACC AIIV4 NO PRSRV 0.5ML IM: CPT | Performed by: FAMILY MEDICINE

## 2020-09-23 PROCEDURE — 85610 PROTHROMBIN TIME: CPT | Performed by: FAMILY MEDICINE

## 2020-09-23 PROCEDURE — G0439 PPPS, SUBSEQ VISIT: HCPCS | Performed by: FAMILY MEDICINE

## 2020-09-23 PROCEDURE — G0008 ADMIN INFLUENZA VIRUS VAC: HCPCS | Performed by: FAMILY MEDICINE

## 2020-09-23 PROCEDURE — 99497 ADVNCD CARE PLAN 30 MIN: CPT | Performed by: FAMILY MEDICINE

## 2020-09-23 PROCEDURE — 81003 URINALYSIS AUTO W/O SCOPE: CPT | Performed by: FAMILY MEDICINE

## 2020-09-23 PROCEDURE — 99214 OFFICE O/P EST MOD 30 MIN: CPT | Performed by: FAMILY MEDICINE

## 2020-09-23 NOTE — PROGRESS NOTES
Subjective   Jean Pierre Lazo is a 89 y.o. male.     Chief Complaint   Patient presents with   • Medicare Wellness-subsequent   • Hypertension   • Hyperlipidemia   • Atrial Fibrillation       The patient is here: to discuss health maintenance and disease prevention to follow up on multiple medical problems.  Last comprehensive physical was on 8/2/2019.  Previous physical was performed by  Zeke Cage MD  Overall has: moderate activity with work/home activities, good appetite, feels well with minor complaints, decreased energy level and is sleeping well. Nutrition: appropriate balanced diet, balanced diet and eating a variety of foods. Last tetanus shot was 7/15/2018. Patient's last colonoscopy was: 1/6/2010. Patients last echo was 8/2/2016.    Hypertension  This is a chronic problem. The current episode started more than 1 year ago. The problem is unchanged. The problem is controlled. Pertinent negatives include no blurred vision, chest pain, palpitations or shortness of breath. Risk factors for coronary artery disease include dyslipidemia and male gender. Current antihypertension treatment includes angiotensin blockers, beta blockers and diuretics. The current treatment provides mild improvement. There are no compliance problems.  Hypertensive end-organ damage includes CAD/MI.   Hyperlipidemia  This is a chronic problem. The current episode started more than 1 year ago. He has no history of diabetes or obesity. Factors aggravating his hyperlipidemia include beta blockers. Pertinent negatives include no chest pain or shortness of breath. Current antihyperlipidemic treatment includes statins. The current treatment provides moderate improvement of lipids. There are no compliance problems.  Risk factors for coronary artery disease include dyslipidemia and hypertension.   Atrial Fibrillation  Presents for follow-up visit. Symptoms include hypertension and weakness. Symptoms are negative for chest pain, palpitations  and shortness of breath. The symptoms have been stable (permanent). Past medical history includes atrial fibrillation and hyperlipidemia.        Recent Hospitalizations:  No hospitalization(s) within the last year..  ccc    I personally reviewed and updated the patient's allergies, medications, problem list, and past medical, surgical, social, and family history. I have reviewed and confirmed the accuracy of the HPI and ROS as documented by the MA/LPN/RN Zeke Cage MD      History reviewed. No pertinent family history.    Social History     Tobacco Use   • Smoking status: Never Smoker   • Smokeless tobacco: Never Used   Substance Use Topics   • Alcohol use: No     Frequency: Never   • Drug use: No       Past Surgical History:   Procedure Laterality Date   • CARDIAC CATHETERIZATION     • CARDIAC VALVE SURGERY     • CHOLECYSTECTOMY     • PACEMAKER IMPLANTATION         Patient Active Problem List   Diagnosis   • Permanent atrial fibrillation (CMS/HCC)   • Atrioventricular block   • Cerebrovascular accident (CVA) (CMS/HCC)   • Cholelithiasis with cholecystitis   • Coronary artery disease   • History of heart valve replacement   • Mixed hyperlipidemia   • Essential hypertension   • Mitral valve disease   • Presence of aortocoronary bypass graft   • Presence of cardiac pacemaker   • Medicare annual wellness visit, subsequent   • Bilateral impacted cerumen   • Actinic keratosis         Current Outpatient Medications:   •  hydroCHLOROthiazide (HYDRODIURIL) 12.5 MG tablet, Take 1 tablet by mouth Daily., Disp: 90 tablet, Rfl: 3  •  losartan (COZAAR) 100 MG tablet, Take 100 mg by mouth Daily. FOR 30 DAYS, Disp: , Rfl:   •  lovastatin (MEVACOR) 20 MG tablet, Take 2 tablets by mouth Daily., Disp: 90 tablet, Rfl: 3  •  metoprolol tartrate (LOPRESSOR) 25 MG tablet, Take 1 tablet by mouth 2 (Two) Times a Day., Disp: 180 tablet, Rfl: 2  •  Multiple Vitamins-Minerals (MULTI VITAMIN/MINERALS) tablet, MULTI VITAMIN/MINERALS TABS,  "Disp: , Rfl:   •  niacin 500 MG tablet, Take 500 mg by mouth Every Night., Disp: , Rfl:   •  warfarin (COUMADIN) 3 MG tablet, TAKE 1 TABLET BY MOUTH ONCE DAILY (Patient taking differently: Take 3 mg by mouth Every Night. Take 3.5 mg on Monday), Disp: 45 tablet, Rfl: 1         Review of Systems   Constitutional: Negative for chills and diaphoresis.   HENT: Negative for trouble swallowing and voice change.    Eyes: Negative for blurred vision and visual disturbance.   Respiratory: Negative for shortness of breath.    Cardiovascular: Negative for chest pain and palpitations.   Gastrointestinal: Negative for abdominal pain and nausea.   Endocrine: Negative for polydipsia and polyphagia.   Genitourinary: Negative for hematuria.   Musculoskeletal: Negative for neck stiffness.   Skin: Negative for color change and pallor.   Allergic/Immunologic: Negative for immunocompromised state.   Neurological: Positive for weakness. Negative for seizures and syncope.   Hematological: Negative for adenopathy.   Psychiatric/Behavioral: Negative for hallucinations, sleep disturbance and suicidal ideas.       I have reviewed and confirmed the accuracy of the ROS as documented by the MA/LPN/RN Zeke Cage MD      Objective   /74 (BP Location: Right arm, Patient Position: Sitting, Cuff Size: Adult)   Pulse 68   Temp 96.3 °F (35.7 °C)   Resp 18   Ht 177.8 cm (70\")   Wt 80.3 kg (177 lb)   SpO2 98%   BMI 25.40 kg/m²   BP Readings from Last 3 Encounters:   09/23/20 131/74   08/24/20 148/85   08/19/20 163/90     Wt Readings from Last 3 Encounters:   09/23/20 80.3 kg (177 lb)   08/24/20 81.4 kg (179 lb 6.4 oz)   08/19/20 81.2 kg (179 lb)     Physical Exam  Constitutional:       Appearance: He is well-developed. He is not diaphoretic.   HENT:      Head: Normocephalic.      Right Ear: Tympanic membrane, ear canal and external ear normal.      Left Ear: Tympanic membrane, ear canal and external ear normal.      Nose: Nose normal. "   Eyes:      General: Lids are normal.      Conjunctiva/sclera: Conjunctivae normal.      Pupils: Pupils are equal, round, and reactive to light.   Neck:      Thyroid: No thyromegaly.      Vascular: No carotid bruit or JVD.      Trachea: No tracheal deviation.   Cardiovascular:      Rate and Rhythm: Normal rate and regular rhythm.      Heart sounds: Normal heart sounds. No murmur. No friction rub. No gallop.    Pulmonary:      Effort: Pulmonary effort is normal.      Breath sounds: Normal breath sounds. No stridor. No decreased breath sounds, wheezing or rales.   Abdominal:      General: Bowel sounds are normal. There is no distension.      Palpations: Abdomen is soft. There is no mass.      Tenderness: There is no abdominal tenderness. There is no guarding or rebound.      Hernia: No hernia is present.   Lymphadenopathy:      Head:      Right side of head: No submental, submandibular, tonsillar, preauricular, posterior auricular or occipital adenopathy.      Left side of head: No submental, submandibular, tonsillar, preauricular, posterior auricular or occipital adenopathy.      Cervical: No cervical adenopathy.   Skin:     General: Skin is warm and dry.      Coloration: Skin is not pale.      Comments: 2 small actinic keratoses bilateral face near chin   Neurological:      Mental Status: He is alert and oriented to person, place, and time.      Cranial Nerves: No cranial nerve deficit.      Sensory: No sensory deficit.      Coordination: Coordination normal.      Gait: Gait normal.      Deep Tendon Reflexes: Reflexes are normal and symmetric.         Recent Lab Results:    Hemoglobin A1C   Date Value Ref Range Status   06/06/2016 4.8 4.6 - 7.1 % Final     Lab Results   Component Value Date    GLU CANCELED 03/25/2020     Lab Results   Component Value Date    LDL 71 08/02/2019    LDL 71 07/20/2018    LDL 85 07/17/2017     Lab Results   Component Value Date    CHOL 136 07/20/2018    CHOL 166 07/17/2017    CHOL 157  05/03/2016     Lab Results   Component Value Date    TRIG 101 08/02/2019    TRIG 94 07/20/2018    TRIG 108 07/17/2017     Lab Results   Component Value Date    HDL 53 08/02/2019    HDL 46 07/20/2018    HDL 59 07/17/2017     No results found for: PSA  Lab Results   Component Value Date    WBC 6.9 03/25/2020    HGB 13.9 03/25/2020    HCT 40.6 03/25/2020    MCV 91 03/25/2020     (L) 03/25/2020     Lab Results   Component Value Date    TSH 1.850 08/02/2019      Lab Results   Component Value Date    GLUCOSE 101 (H) 03/29/2019    BUN 24 03/25/2020    CREATININE 1.18 03/25/2020    EGFRIFNONA 54 (L) 03/25/2020    EGFRIFAFRI 63 03/25/2020    BCR 20 03/25/2020    K CANCELED 03/25/2020    CO2 22 03/25/2020    CALCIUM 9.4 03/25/2020    PROTENTOTREF 6.6 08/02/2019    ALBUMIN 4.2 08/02/2019    LABIL2 1.8 08/02/2019    AST 27 08/02/2019    ALT 8 08/02/2019     No results found for: DAVID, RF, SEDRATE   No results found for: CRP   No results found for: IRON, TIBC, FERRITIN   Lab Results   Component Value Date    PGWQIHRD80 485 08/02/2016          Age-appropriate Screening Schedule:  Refer to the list below for future screening recommendations based on patient's age, sex and/or medical conditions. Orders for these recommended tests are listed in the plan section. The patient has been provided with a written plan.    Health Maintenance   Topic Date Due   • ZOSTER VACCINE (1 of 2) 07/25/1980   • LIPID PANEL  08/02/2020   • TDAP/TD VACCINES (2 - Td) 07/15/2028   • INFLUENZA VACCINE  Completed       Depression Screen:   PHQ-2/PHQ-9 Depression Screening 9/23/2020   Little interest or pleasure in doing things 0   Feeling down, depressed, or hopeless 0   Trouble falling or staying asleep, or sleeping too much 0   Feeling tired or having little energy 0   Poor appetite or overeating 0   Feeling bad about yourself - or that you are a failure or have let yourself or your family down 0   Trouble concentrating on things, such as reading  the newspaper or watching television 0   Moving or speaking so slowly that other people could have noticed. Or the opposite - being so fidgety or restless that you have been moving around a lot more than usual 0   Thoughts that you would be better off dead, or of hurting yourself in some way 0   Total Score 0   If you checked off any problems, how difficult have these problems made it for you to do your work, take care of things at home, or get along with other people? Not difficult at all     I spent more than 16 minutes asking patient questions, counseling and documenting in the chart.    Health Habits and Functional and Cognitive Screening:  Functional & Cognitive Status 9/23/2020   Do you have difficulty preparing food and eating? No   Do you have difficulty bathing yourself, getting dressed or grooming yourself? No   Do you have difficulty using the toilet? No   Do you have difficulty moving around from place to place? No   Do you have trouble with steps or getting out of a bed or a chair? No   Current Diet Well Balanced Diet   Dental Exam Up to date   Eye Exam Up to date   Exercise (times per week) 4 times per week   Current Exercise Activities Include Yard Work   Do you need help using the phone?  No   Are you deaf or do you have serious difficulty hearing?  No   Do you need help with transportation? No   Do you need help shopping? No   Do you need help preparing meals?  No   Do you need help with housework?  No   Do you need help with laundry? No   Do you need help taking your medications? No   Do you need help managing money? No   Do you ever drive or ride in a car without wearing a seat belt? No   Have you felt unusual stress, anger or loneliness in the last month? No   Who do you live with? Spouse   If you need help, do you have trouble finding someone available to you? -   Have you been bothered in the last four weeks by sexual problems? No   Do you have difficulty concentrating, remembering or making  decisions? No       Does the patient have evidence of cognitive impairment? No    Advance Care Planning:  ACP discussion was held with the patient during this visit. Patient has an advance directive in EMR which is still valid.      A face-to-face visit was completed today with patient.  Counseling explanation, and discussion of advanced directives was performed.   The last advanced care visit was performed in 2019.  In a near life ending situation, from which he is not expected to recover functionally, and he is not able to speak for him, he does not want life sustaining measures. We discussed feeding tubes, ventilators and cardiac support as well as dialysis.    I spent more than 16 minutes discussing Advanced Care Planning information and documenting in the chart.    Identification of Risk Factors:  Risk factors include: Cardiovascular risk  Dementia/Memory   Fall Risk  Immunizations Discussed/Encouraged (specific immunizations; Influenza and Pneumococcal 23 ).    Compared to one year ago, the patient feels his physical health is the same.  Compared to one year ago, the patient feels his mental health is the same.    Patient Self-Management and Personalized Health Advice  The patient has been provided with information about: fall prevention and supplements and preventive services including:   · Alcohol Misuse Screening and Counseling  (15 minutes counseling time, Code )  · Annual Wellness Visit (AWV)  · Cardiovascular Disease Screening Tests (may do this order every 5 years in beneficiaries without signs or symptoms of cardiovascular disease)  · Influenza Vaccine and Administration  · Pneumococcal Vaccine and Administration.    Procedure: Actinic keratosis frozen today in usual fashion with liquid nitrogen.  He tolerated procedure well, no complications.      Assessment/Plan      Medications        Problem List         LOS    Medicare wellness.  Doing well, vaccines updated.  Discussed health maintenance,  screening test, lifestyle modification.  Actinic keratosis.  Bilateral face, frozen today.  Consider repeat freezing, resection if persistent symptoms.  Hypertension.  Improved today.  Overall good control.  Recently started HCTZ follow-up recheck kidney function/electrolytes.  History of mechanical mitral valve replacement, A. fib, pacemaker in place.  Followed by cardiology Dr. Graff.  Tolerating Coumadin, signs and symptoms of bleeding discussed, dose adjusted today.  Hyperlipidemia good control on statin.  Discussed diet, exercise and lifestyle modification.  Recheck fasting labs.       Problem List Items Addressed This Visit        Unprioritized    Permanent atrial fibrillation (CMS/HCC)    Relevant Orders    POC INR (Completed)    Mixed hyperlipidemia    Relevant Orders    POC INR (Completed)    Essential hypertension    Relevant Orders    POC INR (Completed)    Presence of cardiac pacemaker    Medicare annual wellness visit, subsequent - Primary    Relevant Orders    POC INR (Completed)    POC Urinalysis Dipstick, Automated (Completed)    Actinic keratosis      Other Visit Diagnoses     Need for influenza vaccination        Relevant Orders    Fluad Quad >65 years (Completed)    Dysuria        Relevant Orders    Urine Culture - Urine, Urine, Clean Catch    Need for pneumococcal vaccination        Relevant Orders    Pneumococcal Conjugate Vaccine 13-Valent All (Completed)              Expected course, medications, and adverse effects discussed.  Call or return if worsening or persistent symptoms.         I wore protective equipment throughout this patient encounter to include mask, gloves and eye protection. Hand hygiene was performed before donning protective equipment and after removal when leaving the room.

## 2020-09-24 DIAGNOSIS — Z95.2 HISTORY OF MITRAL VALVE REPLACEMENT: ICD-10-CM

## 2020-09-24 NOTE — TELEPHONE ENCOUNTER
He is needing a refill on warfarin 3 mg to be sent to [facility] in [place]. He only has a couple days left.

## 2020-09-25 LAB
BACTERIA UR CULT: NO GROWTH
BACTERIA UR CULT: NORMAL

## 2020-09-25 RX ORDER — WARFARIN SODIUM 3 MG/1
3 TABLET ORAL DAILY
Qty: 30 TABLET | Refills: 3 | Status: SHIPPED | OUTPATIENT
Start: 2020-09-25 | End: 2020-09-29

## 2020-09-25 RX ORDER — WARFARIN SODIUM 3 MG/1
TABLET ORAL
Qty: 45 TABLET | Refills: 0 | Status: SHIPPED | OUTPATIENT
Start: 2020-09-25 | End: 2020-09-25 | Stop reason: SDUPTHER

## 2020-09-29 DIAGNOSIS — Z95.2 HISTORY OF MITRAL VALVE REPLACEMENT: ICD-10-CM

## 2020-09-29 RX ORDER — WARFARIN SODIUM 3 MG/1
TABLET ORAL
Qty: 45 TABLET | Refills: 0 | Status: SHIPPED | OUTPATIENT
Start: 2020-09-29 | End: 2021-03-15 | Stop reason: SDUPTHER

## 2020-10-09 ENCOUNTER — CLINICAL SUPPORT (OUTPATIENT)
Dept: FAMILY MEDICINE CLINIC | Facility: CLINIC | Age: 85
End: 2020-10-09

## 2020-10-09 VITALS
BODY MASS INDEX: 25.37 KG/M2 | RESPIRATION RATE: 16 BRPM | TEMPERATURE: 96.6 F | OXYGEN SATURATION: 99 % | HEART RATE: 63 BPM | HEIGHT: 70 IN | WEIGHT: 177.2 LBS | SYSTOLIC BLOOD PRESSURE: 150 MMHG | DIASTOLIC BLOOD PRESSURE: 80 MMHG

## 2020-10-09 DIAGNOSIS — I48.21 PERMANENT ATRIAL FIBRILLATION (HCC): Primary | ICD-10-CM

## 2020-10-09 LAB — INR PPP: 3.4 (ref 2–3)

## 2020-10-09 PROCEDURE — 93793 ANTICOAG MGMT PT WARFARIN: CPT | Performed by: FAMILY MEDICINE

## 2020-10-09 PROCEDURE — 85610 PROTHROMBIN TIME: CPT | Performed by: FAMILY MEDICINE

## 2020-10-09 PROCEDURE — 36416 COLLJ CAPILLARY BLOOD SPEC: CPT | Performed by: FAMILY MEDICINE

## 2020-10-09 NOTE — PROGRESS NOTES
Coumadin Management:  Jean Pierre Lazo is a 89 y.o. male who presents for coumadin management. He is on anticoagulation due to atrial fibrillation.     The goal INR for this patient is 2.0-3.0. The patient has their INR checked 2 times per month    Last INR:  Date: 9/23/2020  INR: 3.7     Management changes made at the last visit include none. Symptoms included easy bruising.    Patient Findings     Positives:  Bruising    Negatives:  Signs/symptoms of thrombosis, Signs/symptoms of bleeding,   Laboratory test error suspected, Change in health, Change in alcohol use,   Change in activity, Upcoming invasive procedure, Emergency department   visit, Upcoming dental procedure, Missed doses, Extra doses, Change in   medications, Change in diet/appetite, Hospital admission, Other complaints        Clinical Outcomes     Negatives:  Major bleeding event, Thromboembolic event,   Anticoagulation-related hospital admission, Anticoagulation-related ED   visit, Anticoagulation-related fatality         Today's INR:  INR   Date Value Ref Range Status   10/09/2020 3.40 (A) 2 - 3 Final       Current INR dose is warfarin 3 mg PO daily. Recent dosing change include unchanged. Repeat INR in 2 weeks.    By report, Jean Pierre is compliant most of the time.    I wore protective equipment throughout this patient encounter to include mask and eye protection. Hand hygiene was performed before donning protective equipment and after removal when leaving the room.

## 2020-10-12 ENCOUNTER — TELEPHONE (OUTPATIENT)
Dept: FAMILY MEDICINE CLINIC | Facility: CLINIC | Age: 85
End: 2020-10-12

## 2020-10-12 NOTE — TELEPHONE ENCOUNTER
----- Message from Zeke Cage MD sent at 10/12/2020  6:05 AM EDT -----  Let him know his blood work looks good, overall blood count, blood sugar, cholesterol all normal, his platelet count and his kidney function are just mildly low but overall stable from the last check, 1 increase his fluid intake, thanks

## 2020-10-23 ENCOUNTER — CLINICAL SUPPORT (OUTPATIENT)
Dept: FAMILY MEDICINE CLINIC | Facility: CLINIC | Age: 85
End: 2020-10-23

## 2020-10-23 VITALS
TEMPERATURE: 98.2 F | HEIGHT: 70 IN | RESPIRATION RATE: 16 BRPM | HEART RATE: 63 BPM | BODY MASS INDEX: 25.74 KG/M2 | WEIGHT: 179.8 LBS | OXYGEN SATURATION: 97 %

## 2020-10-23 DIAGNOSIS — Z95.2 HISTORY OF HEART VALVE REPLACEMENT: ICD-10-CM

## 2020-10-23 LAB — INR PPP: 3.3 (ref 0.9–1.1)

## 2020-10-23 PROCEDURE — 36416 COLLJ CAPILLARY BLOOD SPEC: CPT | Performed by: FAMILY MEDICINE

## 2020-10-23 PROCEDURE — 85610 PROTHROMBIN TIME: CPT | Performed by: FAMILY MEDICINE

## 2020-10-23 PROCEDURE — 93793 ANTICOAG MGMT PT WARFARIN: CPT | Performed by: FAMILY MEDICINE

## 2020-11-06 ENCOUNTER — CLINICAL SUPPORT (OUTPATIENT)
Dept: FAMILY MEDICINE CLINIC | Facility: CLINIC | Age: 85
End: 2020-11-06

## 2020-11-06 VITALS
HEIGHT: 70 IN | OXYGEN SATURATION: 97 % | BODY MASS INDEX: 26.05 KG/M2 | DIASTOLIC BLOOD PRESSURE: 80 MMHG | SYSTOLIC BLOOD PRESSURE: 137 MMHG | RESPIRATION RATE: 18 BRPM | TEMPERATURE: 97.1 F | WEIGHT: 182 LBS | HEART RATE: 60 BPM

## 2020-11-06 DIAGNOSIS — I48.21 PERMANENT ATRIAL FIBRILLATION (HCC): Primary | ICD-10-CM

## 2020-11-06 LAB — INR PPP: 3.1 (ref 2–3)

## 2020-11-06 PROCEDURE — 36416 COLLJ CAPILLARY BLOOD SPEC: CPT | Performed by: FAMILY MEDICINE

## 2020-11-06 PROCEDURE — 85610 PROTHROMBIN TIME: CPT | Performed by: FAMILY MEDICINE

## 2020-11-06 PROCEDURE — 93793 ANTICOAG MGMT PT WARFARIN: CPT | Performed by: FAMILY MEDICINE

## 2020-11-06 NOTE — PROGRESS NOTES
Coumadin Management:  Jean Pierre Lazo is a 89 y.o. male who presents for coumadin management. He is on anticoagulation due to atrial fibrillation.     The goal INR for this patient is 2.0-3.0. The patient has their INR checked 1 time per month    Last INR:  Date: 10/23/2020  INR: 3.3     Management changes made at the last visit include decresed from 3mg daily to 2.5mg on monday and 3mg all other. Symptoms included easy bruising.    Patient Findings     Positives:  Bruising    Negatives:  Signs/symptoms of thrombosis, Signs/symptoms of bleeding,   Laboratory test error suspected, Change in health, Change in alcohol use,   Change in activity, Upcoming invasive procedure, Emergency department   visit, Upcoming dental procedure, Missed doses, Extra doses, Change in   medications, Change in diet/appetite, Hospital admission, Other complaints        Clinical Outcomes     Negatives:  Major bleeding event, Thromboembolic event,   Anticoagulation-related hospital admission, Anticoagulation-related ED   visit, Anticoagulation-related fatality         Today's INR:  INR   Date Value Ref Range Status   11/06/2020 3.10 (A) 2 - 3 Final       Current INR dose is 2.5mg on mondays and 3mg all other days. Recent dosing change include unchanged. Repeat INR in 1 month.    By report, Jean Pierre is compliant most of the time.    I wore protective equipment throughout this patient encounter to include mask and eye protection. Hand hygiene was performed before donning protective equipment and after removal when leaving the room.

## 2020-12-04 ENCOUNTER — OFFICE VISIT (OUTPATIENT)
Dept: FAMILY MEDICINE CLINIC | Facility: CLINIC | Age: 85
End: 2020-12-04

## 2020-12-04 ENCOUNTER — CLINICAL SUPPORT (OUTPATIENT)
Dept: FAMILY MEDICINE CLINIC | Facility: CLINIC | Age: 85
End: 2020-12-04

## 2020-12-04 VITALS
BODY MASS INDEX: 25.83 KG/M2 | DIASTOLIC BLOOD PRESSURE: 85 MMHG | RESPIRATION RATE: 19 BRPM | WEIGHT: 180.4 LBS | OXYGEN SATURATION: 100 % | HEART RATE: 69 BPM | SYSTOLIC BLOOD PRESSURE: 152 MMHG | TEMPERATURE: 96.9 F | HEIGHT: 70 IN

## 2020-12-04 VITALS
HEIGHT: 70 IN | SYSTOLIC BLOOD PRESSURE: 152 MMHG | HEART RATE: 69 BPM | WEIGHT: 180.4 LBS | TEMPERATURE: 96.6 F | BODY MASS INDEX: 25.83 KG/M2 | DIASTOLIC BLOOD PRESSURE: 85 MMHG | RESPIRATION RATE: 18 BRPM | OXYGEN SATURATION: 100 %

## 2020-12-04 DIAGNOSIS — I48.21 PERMANENT ATRIAL FIBRILLATION (HCC): Primary | ICD-10-CM

## 2020-12-04 DIAGNOSIS — Z95.2 HISTORY OF HEART VALVE REPLACEMENT: ICD-10-CM

## 2020-12-04 DIAGNOSIS — H11.31 SUBCONJUNCTIVAL HEMORRHAGE OF RIGHT EYE: ICD-10-CM

## 2020-12-04 DIAGNOSIS — H57.89 EYE REDNESS: ICD-10-CM

## 2020-12-04 LAB — INR PPP: 3.7 (ref 2–3)

## 2020-12-04 PROCEDURE — 99214 OFFICE O/P EST MOD 30 MIN: CPT | Performed by: FAMILY MEDICINE

## 2020-12-04 PROCEDURE — 85610 PROTHROMBIN TIME: CPT | Performed by: FAMILY MEDICINE

## 2020-12-04 PROCEDURE — 36416 COLLJ CAPILLARY BLOOD SPEC: CPT | Performed by: FAMILY MEDICINE

## 2020-12-04 NOTE — PROGRESS NOTES
Subjective   Jean Pierre Lazo is a 89 y.o. male.     Chief Complaint   Patient presents with   • Anticoagulation       Coumadin Management:  Jean Pierre Lazo is a 89 y.o. male who presents for coumadin management. He is on anticoagulation due to atrial fibrillation.     The goal INR for this patient is 2.0-3.0. The patient has their INR checked 1 times per month    Last INR:  Date: 10/23/2020  INR: 3.3     Management changes made at the last visit include none. Symptoms included none.    Patient Findings     Negatives:  Signs/symptoms of thrombosis, Signs/symptoms of bleeding,   Laboratory test error suspected, Change in health, Change in alcohol use,   Change in activity, Upcoming invasive procedure, Emergency department   visit, Upcoming dental procedure, Missed doses, Extra doses, Change in   medications, Change in diet/appetite, Hospital admission, Bruising, Other   complaints      Clinical Outcomes     Negatives:  Major bleeding event, Thromboembolic event,   Anticoagulation-related hospital admission, Anticoagulation-related ED   visit, Anticoagulation-related fatality         Today's INR:  INR   Date Value Ref Range Status   12/04/2020 3.70 (A) 2 - 3 Final       Current INR dose is warfarin 3 mg PO daily, except warfarin 2.5 mg PO on monday. . Recent dosing change include unchanged. Repeat INR in 2 weeks.    By report, Jean Pierre is compliant most of the time.    History of Present Illness     The following portions of the patient's history were reviewed and updated as appropriate: allergies, current medications, past family history, past medical history, past social history, past surgical history and problem list.    Allergies:  No Known Allergies    Social History:  Social History     Socioeconomic History   • Marital status:      Spouse name: Not on file   • Number of children: Not on file   • Years of education: Not on file   • Highest education level: Not on file   Tobacco Use   • Smoking status: Never  Smoker   • Smokeless tobacco: Never Used   Substance and Sexual Activity   • Alcohol use: No     Frequency: Never   • Drug use: No   • Sexual activity: Defer       Family History:  No family history on file.    Past Medical History :  Patient Active Problem List   Diagnosis   • Permanent atrial fibrillation (CMS/HCC)   • Atrioventricular block   • Cerebrovascular accident (CVA) (CMS/HCC)   • Cholelithiasis with cholecystitis   • Coronary artery disease   • History of heart valve replacement   • Mixed hyperlipidemia   • Essential hypertension   • Mitral valve disease   • Presence of aortocoronary bypass graft   • Presence of cardiac pacemaker   • Medicare annual wellness visit, subsequent   • Bilateral impacted cerumen   • Actinic keratosis       Medication List:  Outpatient Encounter Medications as of 12/4/2020   Medication Sig Dispense Refill   • hydroCHLOROthiazide (HYDRODIURIL) 12.5 MG tablet Take 1 tablet by mouth Daily. 90 tablet 3   • losartan (COZAAR) 100 MG tablet Take 100 mg by mouth Daily. FOR 30 DAYS     • lovastatin (MEVACOR) 20 MG tablet Take 2 tablets by mouth Daily. 90 tablet 3   • metoprolol tartrate (LOPRESSOR) 25 MG tablet Take 1 tablet by mouth 2 (Two) Times a Day. 180 tablet 2   • Multiple Vitamins-Minerals (MULTI VITAMIN/MINERALS) tablet MULTI VITAMIN/MINERALS TABS     • niacin 500 MG tablet Take 500 mg by mouth Every Night.     • warfarin (COUMADIN) 3 MG tablet Take 1 tablet by mouth once daily 45 tablet 0     No facility-administered encounter medications on file as of 12/4/2020.        Past Surgical History:  Past Surgical History:   Procedure Laterality Date   • CARDIAC CATHETERIZATION     • CARDIAC VALVE SURGERY     • CHOLECYSTECTOMY     • PACEMAKER IMPLANTATION         Review of Systems:  Review of Systems    I have reviewed and confirmed the accuracy of the HPI and ROS as documented by the MA/LPN/RN Jayla Hargrove MA    Vital Signs:  Visit Vitals  /85 (BP Location: Right arm,  "Patient Position: Sitting, Cuff Size: Adult)   Pulse 69   Temp 96.6 °F (35.9 °C) (Temporal)   Resp 18   Ht 177.8 cm (70\")   Wt 81.8 kg (180 lb 6.4 oz)   SpO2 100% Comment: room air   BMI 25.88 kg/m²       Assessment and Plan:  Problem List Items Addressed This Visit        Unprioritized    Permanent atrial fibrillation (CMS/HCC) - Primary    Relevant Orders    POC INR (Completed)          An After Visit Summary and PPPS were given to the patient.     "

## 2020-12-04 NOTE — PROGRESS NOTES
Subjective   Jean Pierre Lazo is a 89 y.o. male.     Chief Complaint   Patient presents with   • Eye Problem       Eye Problem   The right eye is affected. This is a new problem. The current episode started yesterday. The problem occurs constantly. The problem has been unchanged. There was no injury mechanism. The pain is mild. There is no known exposure to pink eye. He does not wear contacts. Associated symptoms include blurred vision and eye redness. Pertinent negatives include no eye discharge, fever, foreign body sensation, nausea or recent URI.            I personally reviewed and updated the patient's allergies, medications, problem list, and past medical, surgical, social, and family history. I have reviewed and confirmed the accuracy of the History of Present Illness and Review of Symptoms as documented by the MA/LPN/RN. Zeke Cage MD    History reviewed. No pertinent family history.    Social History     Tobacco Use   • Smoking status: Never Smoker   • Smokeless tobacco: Never Used   Substance Use Topics   • Alcohol use: No     Frequency: Never   • Drug use: No       Past Surgical History:   Procedure Laterality Date   • CARDIAC CATHETERIZATION     • CARDIAC VALVE SURGERY     • CHOLECYSTECTOMY     • PACEMAKER IMPLANTATION         Patient Active Problem List   Diagnosis   • Permanent atrial fibrillation (CMS/HCC)   • Atrioventricular block   • Cerebrovascular accident (CVA) (CMS/[facility])   • Cholelithiasis with cholecystitis   • Coronary artery disease   • History of heart valve replacement   • Mixed hyperlipidemia   • Essential hypertension   • Mitral valve disease   • Presence of aortocoronary bypass graft   • Presence of cardiac pacemaker   • Medicare annual wellness visit, subsequent   • Bilateral impacted cerumen   • Actinic keratosis   • Eye redness   • Subconjunctival hemorrhage of right eye         Current Outpatient Medications:   •  hydroCHLOROthiazide (HYDRODIURIL) 12.5 MG tablet, Take 1 tablet by  "mouth Daily., Disp: 90 tablet, Rfl: 3  •  losartan (COZAAR) 100 MG tablet, Take 100 mg by mouth Daily. FOR 30 DAYS, Disp: , Rfl:   •  lovastatin (MEVACOR) 20 MG tablet, Take 2 tablets by mouth Daily., Disp: 90 tablet, Rfl: 3  •  metoprolol tartrate (LOPRESSOR) 25 MG tablet, Take 1 tablet by mouth 2 (Two) Times a Day., Disp: 180 tablet, Rfl: 2  •  Multiple Vitamins-Minerals (MULTI VITAMIN/MINERALS) tablet, MULTI VITAMIN/MINERALS TABS, Disp: , Rfl:   •  niacin 500 MG tablet, Take 500 mg by mouth Every Night., Disp: , Rfl:   •  warfarin (COUMADIN) 3 MG tablet, Take 1 tablet by mouth once daily, Disp: 45 tablet, Rfl: 0         Review of Systems   Constitutional: Negative for chills, diaphoresis and fever.   Eyes: Positive for blurred vision and redness. Negative for discharge and visual disturbance.   Respiratory: Negative for shortness of breath.    Cardiovascular: Negative for chest pain and palpitations.   Gastrointestinal: Negative for abdominal pain and nausea.   Endocrine: Negative for polydipsia and polyphagia.   Musculoskeletal: Negative for neck stiffness.   Skin: Negative for color change and pallor.   Neurological: Negative for seizures and syncope.   Hematological: Negative for adenopathy.       I have reviewed and confirmed the accuracy of the ROS as documented by the MA/LPN/RN Zeke Cage MD      Objective   /85   Pulse 69   Temp 96.9 °F (36.1 °C) (Temporal)   Resp 19   Ht 177.8 cm (70\")   Wt 81.8 kg (180 lb 6.4 oz)   SpO2 100%   BMI 25.88 kg/m²   BP Readings from Last 3 Encounters:   12/04/20 152/85   12/04/20 152/85   11/06/20 137/80     Wt Readings from Last 3 Encounters:   12/04/20 81.8 kg (180 lb 6.4 oz)   12/04/20 81.8 kg (180 lb 6.4 oz)   11/06/20 82.6 kg (182 lb)     Physical Exam  Constitutional:       Appearance: Normal appearance. He is well-developed. He is not diaphoretic.   Eyes:      General: Lids are normal. No scleral icterus.        Right eye: No foreign body or discharge. "         Left eye: No foreign body or discharge.      Extraocular Movements:      Right eye: No nystagmus.      Left eye: No nystagmus.      Conjunctiva/sclera: Conjunctivae normal.      Right eye: Right conjunctiva is not injected. No exudate or hemorrhage.     Left eye: Left conjunctiva is not injected. No exudate or hemorrhage.     Pupils: Pupils are equal, round, and reactive to light.      Funduscopic exam:     Right eye: No hemorrhage, exudate, AV nicking, arteriolar narrowing or papilledema.         Left eye: No hemorrhage, exudate, AV nicking, arteriolar narrowing or papilledema.      Comments: Subconjunctival hemorrhage right eye with some bruising lids, no inflammation   Cardiovascular:      Rate and Rhythm: Normal rate and regular rhythm.      Pulses: Normal pulses.      Heart sounds: Normal heart sounds, S1 normal and S2 normal. No murmur. No friction rub. No gallop.    Pulmonary:      Effort: Pulmonary effort is normal. No accessory muscle usage.      Breath sounds: Normal breath sounds. No stridor. No decreased breath sounds, wheezing, rhonchi or rales.   Abdominal:      General: Bowel sounds are normal. There is no distension.      Palpations: Abdomen is soft. Abdomen is not rigid. There is no mass or pulsatile mass.      Tenderness: There is no abdominal tenderness. There is no guarding or rebound. Negative signs include Daigle's sign.      Hernia: No hernia is present.   Skin:     General: Skin is warm and dry.      Coloration: Skin is not pale.   Neurological:      Mental Status: He is alert and oriented to person, place, and time.      Coordination: Coordination normal.      Gait: Gait normal.         Recent Lab Results:    Hemoglobin A1C   Date Value Ref Range Status   06/06/2016 4.8 4.6 - 7.1 % Final     Lab Results   Component Value Date    GLU 94 10/09/2020     Lab Results   Component Value Date    LDL 69 10/09/2020    LDL 71 08/02/2019    LDL 71 07/20/2018     Lab Results   Component Value  Date    CHOL 136 07/20/2018    CHOL 166 07/17/2017    CHOL 157 05/03/2016     Lab Results   Component Value Date    TRIG 125 10/09/2020    TRIG 101 08/02/2019    TRIG 94 07/20/2018     Lab Results   Component Value Date    HDL 48 10/09/2020    HDL 53 08/02/2019    HDL 46 07/20/2018     No results found for: PSA  Lab Results   Component Value Date    WBC 6.7 10/09/2020    HGB 13.1 10/09/2020    HCT 38.4 10/09/2020    MCV 93 10/09/2020     (L) 10/09/2020     Lab Results   Component Value Date    TSH 1.700 10/09/2020      Lab Results   Component Value Date    GLUCOSE 101 (H) 03/29/2019    BUN 32 10/09/2020    CREATININE 1.37 (H) 10/09/2020    EGFRIFNONA 45 (L) 10/09/2020    EGFRIFAFRI 52 (L) 10/09/2020    BCR 23 10/09/2020    K 4.4 10/09/2020    CO2 24 10/09/2020    CALCIUM 9.5 10/09/2020    PROTENTOTREF 6.2 10/09/2020    ALBUMIN 4.0 10/09/2020    LABIL2 1.8 10/09/2020    AST 30 10/09/2020    ALT 10 10/09/2020     No results found for: DAVID, RF, SEDRATE   No results found for: CRP   No results found for: IRON, TIBC, FERRITIN   Lab Results   Component Value Date    YTXBXCMH36 485 08/02/2016          Assessment/Plan      Medications        Problem List         LOS      Health maintenance.   Doing well, vaccines updated.  Discussed health maintenance, screening test, lifestyle modification.  Actinic keratosis.  Bilateral face, improved status post freezing today.  Consider repeat freezing, resection if persistent symptoms.  Hypertension.  Improved today.  Overall good control.  Recently started HCTZ follow-up recheck kidney function/electrolytes.  History of mechanical mitral valve replacement, A. fib, pacemaker in place.  Followed by cardiology Dr. Graff.  Tolerating Coumadin, signs and symptoms of bleeding discussed, dose adjusted today.  Hyperlipidemia good control on statin.  Discussed diet, exercise and lifestyle modification.  Recheck fasting labs.  Subconjunctival hemorrhage/eyelid bruising.  Spontaneous  onset, no straining or cough, no trauma.  Benefit Coumadin outweighs risk currently but will attempt to keep INR below 3, Rx adjusted.  Follow-up monitor INR levels closely.  Signs and symptoms of bleeding discussed, call return if worsening symptoms.    Problem List Items Addressed This Visit        Unprioritized    Permanent atrial fibrillation (CMS/HCC) - Primary    History of heart valve replacement    Eye redness    Subconjunctival hemorrhage of right eye              Expected course, medications, and adverse effects discussed.  Call or return if worsening or persistent symptoms.  I wore protective equipment throughout this patient encounter including a mask, gloves, and eye protection.  Hand hygiene was performed before donning protective equipment and after removal when leaving the room.

## 2020-12-05 DIAGNOSIS — E78.2 MIXED HYPERLIPIDEMIA: ICD-10-CM

## 2020-12-07 ENCOUNTER — TELEPHONE (OUTPATIENT)
Dept: FAMILY MEDICINE CLINIC | Facility: CLINIC | Age: 85
End: 2020-12-07

## 2020-12-07 RX ORDER — LOVASTATIN 20 MG/1
TABLET ORAL
Qty: 90 TABLET | Refills: 0 | Status: SHIPPED | OUTPATIENT
Start: 2020-12-07 | End: 2021-01-15 | Stop reason: SDUPTHER

## 2020-12-07 NOTE — TELEPHONE ENCOUNTER
Patient called requesting a refill of his cholesterol medication to be sent to [facility]

## 2020-12-18 ENCOUNTER — CLINICAL SUPPORT (OUTPATIENT)
Dept: FAMILY MEDICINE CLINIC | Facility: CLINIC | Age: 85
End: 2020-12-18

## 2020-12-18 VITALS
RESPIRATION RATE: 16 BRPM | TEMPERATURE: 96.8 F | DIASTOLIC BLOOD PRESSURE: 70 MMHG | HEART RATE: 61 BPM | BODY MASS INDEX: 26.17 KG/M2 | OXYGEN SATURATION: 98 % | WEIGHT: 182.8 LBS | HEIGHT: 70 IN | SYSTOLIC BLOOD PRESSURE: 148 MMHG

## 2020-12-18 DIAGNOSIS — I48.21 PERMANENT ATRIAL FIBRILLATION (HCC): Primary | ICD-10-CM

## 2020-12-18 LAB — INR PPP: 3.1 (ref 2–3)

## 2020-12-18 PROCEDURE — 93793 ANTICOAG MGMT PT WARFARIN: CPT | Performed by: FAMILY MEDICINE

## 2020-12-18 PROCEDURE — 36416 COLLJ CAPILLARY BLOOD SPEC: CPT | Performed by: FAMILY MEDICINE

## 2020-12-18 PROCEDURE — 85610 PROTHROMBIN TIME: CPT | Performed by: FAMILY MEDICINE

## 2020-12-18 NOTE — PROGRESS NOTES
Coumadin Management:  Jean Pierre Lazo is a 89 y.o. male who presents for coumadin management. He is on anticoagulation due to atrial fibrillation.     The goal INR for this patient is 2.0-3.0. The patient has their INR checked one time per month    Last INR:  Date: 12/4/2020  INR: 3.7     Management changes made at the last visit include changing to dose to warfarin 2.5 mg PO on Mon/Wed/Fri and 3 mg PO Tu/Thur/Sat/Sun. Symptoms included easy bruising.    Patient Findings     Positives:  Bruising    Negatives:  Signs/symptoms of thrombosis, Signs/symptoms of bleeding,   Laboratory test error suspected, Change in health, Change in alcohol use,   Change in activity, Upcoming invasive procedure, Emergency department   visit, Upcoming dental procedure, Missed doses, Extra doses, Change in   medications, Change in diet/appetite, Hospital admission, Other complaints        Clinical Outcomes     Negatives:  Major bleeding event, Thromboembolic event,   Anticoagulation-related hospital admission, Anticoagulation-related ED   visit, Anticoagulation-related fatality         Today's INR:  INR   Date Value Ref Range Status   12/18/2020 3.10 (A) 2 - 3 Final       Current INR dose is warfarin 2.5 mg PO on Mon/Wed/Fri and 3 mg PO Tu/Thur/Sat/Sun. Recent dosing change include unchanged. Repeat INR in 1 month.    By report, Jean Pierre is compliant all of the time.    I wore protective equipment throughout this patient encounter to include mask and eye protection. Hand hygiene was performed before donning protective equipment and after removal when leaving the room.

## 2021-01-01 ENCOUNTER — NURSE TRIAGE (OUTPATIENT)
Dept: CALL CENTER | Facility: HOSPITAL | Age: 86
End: 2021-01-01

## 2021-01-01 ENCOUNTER — OFFICE VISIT (OUTPATIENT)
Dept: FAMILY MEDICINE CLINIC | Facility: CLINIC | Age: 86
End: 2021-01-01

## 2021-01-01 ENCOUNTER — TELEPHONE (OUTPATIENT)
Dept: FAMILY MEDICINE CLINIC | Facility: CLINIC | Age: 86
End: 2021-01-01

## 2021-01-01 ENCOUNTER — PROCEDURE VISIT (OUTPATIENT)
Dept: FAMILY MEDICINE CLINIC | Facility: CLINIC | Age: 86
End: 2021-01-01

## 2021-01-01 ENCOUNTER — CLINICAL SUPPORT (OUTPATIENT)
Dept: FAMILY MEDICINE CLINIC | Facility: CLINIC | Age: 86
End: 2021-01-01

## 2021-01-01 ENCOUNTER — HOSPITAL ENCOUNTER (OUTPATIENT)
Dept: GENERAL RADIOLOGY | Facility: HOSPITAL | Age: 86
Discharge: HOME | End: 2021-08-16
Admitting: FAMILY MEDICINE

## 2021-01-01 ENCOUNTER — OFFICE VISIT (OUTPATIENT)
Dept: CARDIOLOGY | Facility: CLINIC | Age: 86
End: 2021-01-01

## 2021-01-01 ENCOUNTER — HOSPITAL ENCOUNTER (OUTPATIENT)
Dept: GENERAL RADIOLOGY | Facility: HOSPITAL | Age: 86
Discharge: HOME | End: 2021-06-21
Admitting: FAMILY MEDICINE

## 2021-01-01 ENCOUNTER — CLINICAL SUPPORT NO REQUIREMENTS (OUTPATIENT)
Dept: CARDIOLOGY | Facility: CLINIC | Age: 86
End: 2021-01-01

## 2021-01-01 ENCOUNTER — HOSPITAL ENCOUNTER (OUTPATIENT)
Dept: CARDIOLOGY | Facility: HOSPITAL | Age: 86
Discharge: HOME | End: 2021-10-14

## 2021-01-01 VITALS
HEART RATE: 62 BPM | DIASTOLIC BLOOD PRESSURE: 82 MMHG | SYSTOLIC BLOOD PRESSURE: 132 MMHG | BODY MASS INDEX: 26.37 KG/M2 | HEIGHT: 70 IN | OXYGEN SATURATION: 98 % | TEMPERATURE: 96.8 F | WEIGHT: 184.2 LBS

## 2021-01-01 VITALS
HEIGHT: 70 IN | RESPIRATION RATE: 18 BRPM | OXYGEN SATURATION: 98 % | TEMPERATURE: 97.1 F | WEIGHT: 183.6 LBS | BODY MASS INDEX: 26.28 KG/M2 | HEART RATE: 66 BPM

## 2021-01-01 VITALS
SYSTOLIC BLOOD PRESSURE: 131 MMHG | BODY MASS INDEX: 24.62 KG/M2 | HEART RATE: 62 BPM | HEIGHT: 70 IN | DIASTOLIC BLOOD PRESSURE: 69 MMHG | WEIGHT: 172 LBS

## 2021-01-01 VITALS
DIASTOLIC BLOOD PRESSURE: 80 MMHG | WEIGHT: 177.2 LBS | SYSTOLIC BLOOD PRESSURE: 132 MMHG | HEART RATE: 66 BPM | HEIGHT: 70 IN | TEMPERATURE: 97.1 F | RESPIRATION RATE: 18 BRPM | BODY MASS INDEX: 25.37 KG/M2 | OXYGEN SATURATION: 99 %

## 2021-01-01 VITALS
RESPIRATION RATE: 20 BRPM | BODY MASS INDEX: 26.26 KG/M2 | WEIGHT: 183.4 LBS | DIASTOLIC BLOOD PRESSURE: 80 MMHG | HEART RATE: 59 BPM | TEMPERATURE: 97.3 F | SYSTOLIC BLOOD PRESSURE: 140 MMHG | HEIGHT: 70 IN | OXYGEN SATURATION: 96 %

## 2021-01-01 VITALS
SYSTOLIC BLOOD PRESSURE: 128 MMHG | DIASTOLIC BLOOD PRESSURE: 88 MMHG | HEIGHT: 70 IN | OXYGEN SATURATION: 99 % | WEIGHT: 169.6 LBS | RESPIRATION RATE: 18 BRPM | HEART RATE: 65 BPM | TEMPERATURE: 97.3 F | BODY MASS INDEX: 24.28 KG/M2

## 2021-01-01 VITALS
SYSTOLIC BLOOD PRESSURE: 162 MMHG | WEIGHT: 180 LBS | DIASTOLIC BLOOD PRESSURE: 82 MMHG | BODY MASS INDEX: 25.77 KG/M2 | TEMPERATURE: 97.8 F | OXYGEN SATURATION: 99 % | HEART RATE: 59 BPM | RESPIRATION RATE: 20 BRPM | HEIGHT: 70 IN

## 2021-01-01 VITALS
WEIGHT: 177.4 LBS | HEIGHT: 70 IN | HEART RATE: 66 BPM | BODY MASS INDEX: 25.4 KG/M2 | DIASTOLIC BLOOD PRESSURE: 78 MMHG | SYSTOLIC BLOOD PRESSURE: 147 MMHG | OXYGEN SATURATION: 98 % | RESPIRATION RATE: 18 BRPM | TEMPERATURE: 97.8 F

## 2021-01-01 VITALS
HEART RATE: 59 BPM | DIASTOLIC BLOOD PRESSURE: 94 MMHG | BODY MASS INDEX: 25.77 KG/M2 | SYSTOLIC BLOOD PRESSURE: 169 MMHG | HEIGHT: 70 IN | WEIGHT: 180 LBS | OXYGEN SATURATION: 98 %

## 2021-01-01 VITALS
DIASTOLIC BLOOD PRESSURE: 82 MMHG | OXYGEN SATURATION: 98 % | SYSTOLIC BLOOD PRESSURE: 150 MMHG | TEMPERATURE: 98 F | WEIGHT: 180 LBS | RESPIRATION RATE: 18 BRPM | HEART RATE: 65 BPM | BODY MASS INDEX: 25.77 KG/M2 | HEIGHT: 70 IN

## 2021-01-01 VITALS
HEART RATE: 63 BPM | BODY MASS INDEX: 25.2 KG/M2 | SYSTOLIC BLOOD PRESSURE: 140 MMHG | DIASTOLIC BLOOD PRESSURE: 80 MMHG | WEIGHT: 176 LBS | HEIGHT: 70 IN | RESPIRATION RATE: 20 BRPM | OXYGEN SATURATION: 98 % | TEMPERATURE: 97.3 F

## 2021-01-01 VITALS
DIASTOLIC BLOOD PRESSURE: 86 MMHG | BODY MASS INDEX: 24.51 KG/M2 | RESPIRATION RATE: 17 BRPM | HEIGHT: 70 IN | OXYGEN SATURATION: 99 % | WEIGHT: 171.2 LBS | SYSTOLIC BLOOD PRESSURE: 161 MMHG | HEART RATE: 60 BPM

## 2021-01-01 VITALS
OXYGEN SATURATION: 98 % | HEART RATE: 62 BPM | BODY MASS INDEX: 25.83 KG/M2 | SYSTOLIC BLOOD PRESSURE: 158 MMHG | DIASTOLIC BLOOD PRESSURE: 78 MMHG | TEMPERATURE: 97.3 F | RESPIRATION RATE: 18 BRPM | WEIGHT: 180 LBS

## 2021-01-01 VITALS
HEART RATE: 63 BPM | TEMPERATURE: 97.7 F | OXYGEN SATURATION: 98 % | SYSTOLIC BLOOD PRESSURE: 140 MMHG | WEIGHT: 172.2 LBS | BODY MASS INDEX: 24.65 KG/M2 | HEIGHT: 70 IN | DIASTOLIC BLOOD PRESSURE: 84 MMHG | RESPIRATION RATE: 19 BRPM

## 2021-01-01 DIAGNOSIS — R53.1 WEAKNESS: ICD-10-CM

## 2021-01-01 DIAGNOSIS — I48.21 PERMANENT ATRIAL FIBRILLATION (HCC): ICD-10-CM
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
## 2021-01-01 DIAGNOSIS — I05.9 MITRAL VALVE DISEASE: ICD-10-CM

## 2021-01-01 DIAGNOSIS — D64.9 ANEMIA, UNSPECIFIED TYPE: ICD-10-CM

## 2021-01-01 DIAGNOSIS — I25.10 CORONARY ARTERY DISEASE INVOLVING NATIVE CORONARY ARTERY OF NATIVE HEART WITHOUT ANGINA PECTORIS: ICD-10-CM

## 2021-01-01 DIAGNOSIS — R06.02 SOB (SHORTNESS OF BREATH): Primary | ICD-10-CM

## 2021-01-01 DIAGNOSIS — Z87.891 HISTORY OF TOBACCO USE: ICD-10-CM

## 2021-01-01 DIAGNOSIS — Z95.0 PRESENCE OF CARDIAC PACEMAKER: ICD-10-CM

## 2021-01-01 DIAGNOSIS — R53.83 FATIGUE, UNSPECIFIED TYPE: ICD-10-CM

## 2021-01-01 DIAGNOSIS — I48.21 PERMANENT ATRIAL FIBRILLATION (HCC): Primary | ICD-10-CM

## 2021-01-01 DIAGNOSIS — R05.9 COUGH: ICD-10-CM

## 2021-01-01 DIAGNOSIS — L57.0 ACTINIC KERATOSIS: ICD-10-CM

## 2021-01-01 DIAGNOSIS — R26.81 UNSTEADY GAIT: ICD-10-CM

## 2021-01-01 DIAGNOSIS — Z09 HOSPITAL DISCHARGE FOLLOW-UP: Primary | ICD-10-CM

## 2021-01-01 DIAGNOSIS — R07.9 CHEST PAIN, UNSPECIFIED TYPE: ICD-10-CM

## 2021-01-01 DIAGNOSIS — E78.2 MIXED HYPERLIPIDEMIA: ICD-10-CM

## 2021-01-01 DIAGNOSIS — I44.30 ATRIOVENTRICULAR BLOCK: Primary | ICD-10-CM

## 2021-01-01 DIAGNOSIS — K21.9 GASTROESOPHAGEAL REFLUX DISEASE, UNSPECIFIED WHETHER ESOPHAGITIS PRESENT: ICD-10-CM

## 2021-01-01 DIAGNOSIS — R06.02 SHORTNESS OF BREATH: Primary | ICD-10-CM

## 2021-01-01 DIAGNOSIS — L98.9 SKIN LESION: ICD-10-CM

## 2021-01-01 DIAGNOSIS — R60.9 EDEMA, UNSPECIFIED TYPE: Primary | ICD-10-CM

## 2021-01-01 DIAGNOSIS — I10 ESSENTIAL HYPERTENSION: ICD-10-CM

## 2021-01-01 DIAGNOSIS — Z00.00 MEDICARE ANNUAL WELLNESS VISIT, SUBSEQUENT: Primary | ICD-10-CM
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
## 2021-01-01 DIAGNOSIS — R60.9 EDEMA, UNSPECIFIED TYPE: ICD-10-CM

## 2021-01-01 DIAGNOSIS — Z95.2 HISTORY OF MITRAL VALVE REPLACEMENT: ICD-10-CM

## 2021-01-01 DIAGNOSIS — R53.81 MALAISE AND FATIGUE: ICD-10-CM

## 2021-01-01 DIAGNOSIS — R06.02 SHORTNESS OF BREATH: ICD-10-CM

## 2021-01-01 DIAGNOSIS — Z98.818 OTHER DENTAL PROCEDURE STATUS: Primary | ICD-10-CM

## 2021-01-01 DIAGNOSIS — Z12.11 SCREENING FOR MALIGNANT NEOPLASM OF COLON: ICD-10-CM

## 2021-01-01 DIAGNOSIS — R30.0 DYSURIA: ICD-10-CM

## 2021-01-01 DIAGNOSIS — R53.83 MALAISE AND FATIGUE: ICD-10-CM

## 2021-01-01 DIAGNOSIS — K12.2 ORAL INFECTION: ICD-10-CM

## 2021-01-01 DIAGNOSIS — Z98.818 OTHER DENTAL PROCEDURE STATUS: ICD-10-CM

## 2021-01-01 DIAGNOSIS — Z23 NEED FOR INFLUENZA VACCINATION: ICD-10-CM

## 2021-01-01 DIAGNOSIS — R53.1 WEAKNESS: Primary | ICD-10-CM

## 2021-01-01 DIAGNOSIS — Z95.0 PRESENCE OF CARDIAC PACEMAKER: Primary | ICD-10-CM

## 2021-01-01 DIAGNOSIS — I50.31 ACUTE DIASTOLIC CHF (CONGESTIVE HEART FAILURE) (HCC): ICD-10-CM

## 2021-01-01 DIAGNOSIS — R29.898 WEAKNESS OF BOTH LOWER EXTREMITIES: ICD-10-CM

## 2021-01-01 DIAGNOSIS — N41.0 ACUTE PROSTATITIS: ICD-10-CM

## 2021-01-01 DIAGNOSIS — Z12.5 SCREENING FOR MALIGNANT NEOPLASM OF PROSTATE: ICD-10-CM

## 2021-01-01 LAB
ALBUMIN SERPL-MCNC: 4.1 G/DL (ref 3.5–4.6)
ALBUMIN/GLOB SERPL: 1.9 {RATIO} (ref 1.2–2.2)
ALP SERPL-CCNC: 67 IU/L (ref 48–121)
ALT SERPL-CCNC: 9 IU/L (ref 0–44)
AST SERPL-CCNC: 29 IU/L (ref 0–40)
BACTERIA UR CULT: NORMAL
BACTERIA UR CULT: NORMAL
BASOPHILS # BLD AUTO: 0.1 X10E3/UL (ref 0–0.2)
BASOPHILS NFR BLD AUTO: 1 %
BH CV ECHO MEAS - ACS: 2.2 CM
BH CV ECHO MEAS - AI DEC SLOPE: 307.5 CM/SEC^2
BH CV ECHO MEAS - AI DEC TIME: 1.5 SEC
BH CV ECHO MEAS - AI MAX PG: 85.6 MMHG
BH CV ECHO MEAS - AI MAX VEL: 462.6 CM/SEC
BH CV ECHO MEAS - AI P1/2T: 440.6 MSEC
BH CV ECHO MEAS - AO MAX PG (FULL): 0.88 MMHG
BH CV ECHO MEAS - AO MAX PG: 3.2 MMHG
BH CV ECHO MEAS - AO MEAN PG (FULL): 1.4 MMHG
BH CV ECHO MEAS - AO MEAN PG: 2.7 MMHG
BH CV ECHO MEAS - AO ROOT AREA (BSA CORRECTED): 2.1
BH CV ECHO MEAS - AO ROOT AREA: 13.3 CM^2
BH CV ECHO MEAS - AO ROOT DIAM: 4.1 CM
BH CV ECHO MEAS - AO V2 MAX: 74.7 CM/SEC
BH CV ECHO MEAS - AO V2 MEAN: 77 CM/SEC
BH CV ECHO MEAS - AO V2 VTI: 23.4 CM
BH CV ECHO MEAS - ASC AORTA: 3.6 CM
BH CV ECHO MEAS - AVA(I,A): 3.4 CM^2
BH CV ECHO MEAS - AVA(I,D): 3.4 CM^2
BH CV ECHO MEAS - AVA(V,A): 5.2 CM^2
BH CV ECHO MEAS - AVA(V,D): 5.2 CM^2
BH CV ECHO MEAS - BSA(HAYCOCK): 2 M^2
BH CV ECHO MEAS - BSA: 2 M^2
BH CV ECHO MEAS - BZI_BMI: 24.7 KILOGRAMS/M^2
BH CV ECHO MEAS - BZI_METRIC_HEIGHT: 177.8 CM
BH CV ECHO MEAS - BZI_METRIC_WEIGHT: 78 KG
BH CV ECHO MEAS - EDV(CUBED): 108.6 ML
BH CV ECHO MEAS - EDV(MOD-SP4): 102.4 ML
BH CV ECHO MEAS - EDV(TEICH): 106 ML
BH CV ECHO MEAS - EF(CUBED): 47.2 %
BH CV ECHO MEAS - EF(MOD-SP4): 42.7 %
BH CV ECHO MEAS - EF(TEICH): 39.4 %
BH CV ECHO MEAS - ESV(CUBED): 57.4 ML
BH CV ECHO MEAS - ESV(MOD-SP4): 58.6 ML
BH CV ECHO MEAS - ESV(TEICH): 64.2 ML
BH CV ECHO MEAS - FS: 19.2 %
BH CV ECHO MEAS - IVS/LVPW: 1
BH CV ECHO MEAS - IVSD: 1.3 CM
BH CV ECHO MEAS - LA DIMENSION: 6.6 CM
BH CV ECHO MEAS - LA/AO: 1.6
BH CV ECHO MEAS - LV DIASTOLIC VOL/BSA (35-75): 52.3 ML/M^2
BH CV ECHO MEAS - LV IVRT: 0.07 SEC
BH CV ECHO MEAS - LV MASS(C)D: 242.5 GRAMS
BH CV ECHO MEAS - LV MASS(C)DI: 123.9 GRAMS/M^2
BH CV ECHO MEAS - LV MAX PG: 2.4 MMHG
BH CV ECHO MEAS - LV MEAN PG: 1.3 MMHG
BH CV ECHO MEAS - LV SYSTOLIC VOL/BSA (12-30): 29.9 ML/M^2
BH CV ECHO MEAS - LV V1 MAX: 76.6 CM/SEC
BH CV ECHO MEAS - LV V1 MEAN: 53 CM/SEC
BH CV ECHO MEAS - LV V1 VTI: 15.9 CM
BH CV ECHO MEAS - LVIDD: 4.8 CM
BH CV ECHO MEAS - LVIDS: 3.9 CM
BH CV ECHO MEAS - LVOT AREA: 5.1 CM^2
BH CV ECHO MEAS - LVOT DIAM: 2.5 CM
BH CV ECHO MEAS - LVPWD: 1.3 CM
BH CV ECHO MEAS - MV E MAX VEL: 133.4 CM/SEC
BH CV ECHO MEAS - MV MAX PG: 10.1 MMHG
BH CV ECHO MEAS - MV MEAN PG: 3 MMHG
BH CV ECHO MEAS - MV V2 MAX: 158.6 CM/SEC
BH CV ECHO MEAS - MV V2 MEAN: 75.8 CM/SEC
BH CV ECHO MEAS - MV V2 VTI: 33.3 CM
BH CV ECHO MEAS - MVA(VTI): 2.4 CM^2
BH CV ECHO MEAS - PA ACC TIME: 0.09 SEC
BH CV ECHO MEAS - PA MAX PG (FULL): 1.8 MMHG
BH CV ECHO MEAS - PA MAX PG: 2.5 MMHG
BH CV ECHO MEAS - PA MEAN PG (FULL): 0.96 MMHG
BH CV ECHO MEAS - PA MEAN PG: 1.4 MMHG
BH CV ECHO MEAS - PA PR(ACCEL): 37 MMHG
BH CV ECHO MEAS - PA V2 MAX: 79.2 CM/SEC
BH CV ECHO MEAS - PA V2 MEAN: 55.9 CM/SEC
BH CV ECHO MEAS - PA V2 VTI: 15.7 CM
BH CV ECHO MEAS - PI END-D VEL: 99.4 CM/SEC
BH CV ECHO MEAS - PI MAX PG: 15 MMHG
BH CV ECHO MEAS - PI MAX VEL: 193.6 CM/SEC
BH CV ECHO MEAS - PVA(I,A): 3.9 CM^2
BH CV ECHO MEAS - PVA(I,D): 3.9 CM^2
BH CV ECHO MEAS - PVA(V,A): 3.7 CM^2
BH CV ECHO MEAS - PVA(V,D): 3.7 CM^2
BH CV ECHO MEAS - QP/QS: 0.75
BH CV ECHO MEAS - RAP SYSTOLE: 15 MMHG
BH CV ECHO MEAS - RV MAX PG: 0.71 MMHG
BH CV ECHO MEAS - RV MEAN PG: 0.42 MMHG
BH CV ECHO MEAS - RV V1 MAX: 42.2 CM/SEC
BH CV ECHO MEAS - RV V1 MEAN: 30.7 CM/SEC
BH CV ECHO MEAS - RV V1 VTI: 8.8 CM
BH CV ECHO MEAS - RVOT AREA: 6.9 CM^2
BH CV ECHO MEAS - RVOT DIAM: 3 CM
BH CV ECHO MEAS - RVSP: 39.8 MMHG
BH CV ECHO MEAS - SI(AO): 159.4 ML/M^2
BH CV ECHO MEAS - SI(CUBED): 26.2 ML/M^2
BH CV ECHO MEAS - SI(LVOT): 41 ML/M^2
BH CV ECHO MEAS - SI(MOD-SP4): 22.4 ML/M^2
BH CV ECHO MEAS - SI(TEICH): 21.4 ML/M^2
BH CV ECHO MEAS - SV(AO): 312.1 ML
BH CV ECHO MEAS - SV(CUBED): 51.2 ML
BH CV ECHO MEAS - SV(LVOT): 80.3 ML
BH CV ECHO MEAS - SV(MOD-SP4): 43.8 ML
BH CV ECHO MEAS - SV(RVOT): 60.4 ML
BH CV ECHO MEAS - SV(TEICH): 41.8 ML
BH CV ECHO MEAS - TR MAX VEL: 248.9 CM/SEC
BH CV VAS BP LEFT ARM: NORMAL MMHG
BILIRUB BLD-MCNC: ABNORMAL MG/DL
BILIRUB SERPL-MCNC: 1.3 MG/DL (ref 0–1.2)
BUN SERPL-MCNC: 21 MG/DL (ref 10–36)
BUN/CREAT SERPL: 14 (ref 10–24)
CALCIUM SERPL-MCNC: 9.5 MG/DL (ref 8.6–10.2)
CHLORIDE SERPL-SCNC: 107 MMOL/L (ref 96–106)
CLARITY, POC: CLEAR
CO2 SERPL-SCNC: 23 MMOL/L (ref 20–29)
COLOR UR: ABNORMAL
CREAT SERPL-MCNC: 1.51 MG/DL (ref 0.76–1.27)
EOSINOPHIL # BLD AUTO: 0.2 X10E3/UL (ref 0–0.4)
EOSINOPHIL NFR BLD AUTO: 2 %
ERYTHROCYTE [DISTWIDTH] IN BLOOD BY AUTOMATED COUNT: 13.3 % (ref 11.6–15.4)
GLOBULIN SER CALC-MCNC: 2.2 G/DL (ref 1.5–4.5)
GLUCOSE SERPL-MCNC: 118 MG/DL (ref 65–99)
GLUCOSE UR STRIP-MCNC: NEGATIVE MG/DL
HCT VFR BLD AUTO: 45.1 % (ref 37.5–51)
HGB BLD-MCNC: 15 G/DL (ref 13–17.7)
IMM GRANULOCYTES # BLD AUTO: 0 X10E3/UL (ref 0–0.1)
IMM GRANULOCYTES NFR BLD AUTO: 0 %
INR PPP: 1.9 (ref 0.9–1.1)
INR PPP: 2.1 (ref 2–3)
INR PPP: 2.4 (ref 2–3)
INR PPP: 2.5 (ref 2–3)
INR PPP: 3.7 (ref 2–3)
INR PPP: 4.1 (ref 2–3)
INR PPP: 4.8 (ref 2–3)
KETONES UR QL: ABNORMAL
LEUKOCYTE EST, POC: ABNORMAL
LYMPHOCYTES # BLD AUTO: 1.8 X10E3/UL (ref 0.7–3.1)
LYMPHOCYTES NFR BLD AUTO: 21 %
MCH RBC QN AUTO: 29.9 PG (ref 26.6–33)
MCHC RBC AUTO-ENTMCNC: 33.3 G/DL (ref 31.5–35.7)
MCV RBC AUTO: 90 FL (ref 79–97)
MONOCYTES # BLD AUTO: 1.6 X10E3/UL (ref 0.1–0.9)
MONOCYTES NFR BLD AUTO: 18 %
NEUTROPHILS # BLD AUTO: 5 X10E3/UL (ref 1.4–7)
NEUTROPHILS NFR BLD AUTO: 58 %
NITRITE UR-MCNC: NEGATIVE MG/ML
NT-PROBNP SERPL-MCNC: 3450 PG/ML (ref 0–486)
PH UR: 7 [PH] (ref 5–8)
PLATELET # BLD AUTO: 154 X10E3/UL (ref 150–450)
POTASSIUM SERPL-SCNC: 4.1 MMOL/L (ref 3.5–5.2)
PROT SERPL-MCNC: 6.3 G/DL (ref 6–8.5)
PROT UR STRIP-MCNC: ABNORMAL MG/DL
RBC # BLD AUTO: 5.01 X10E6/UL (ref 4.14–5.8)
RBC # UR STRIP: ABNORMAL /UL
SODIUM SERPL-SCNC: 146 MMOL/L (ref 134–144)
SP GR UR: 1.01 (ref 1–1.03)
TSH SERPL DL<=0.005 MIU/L-ACNC: 2.35 UIU/ML (ref 0.45–4.5)
UROBILINOGEN UR QL: ABNORMAL
WBC # BLD AUTO: 8.6 X10E3/UL (ref 3.4–10.8)

## 2021-01-01 PROCEDURE — 36416 COLLJ CAPILLARY BLOOD SPEC: CPT | Performed by: FAMILY MEDICINE

## 2021-01-01 PROCEDURE — 93306 TTE W/DOPPLER COMPLETE: CPT | Performed by: INTERNAL MEDICINE

## 2021-01-01 PROCEDURE — G0439 PPPS, SUBSEQ VISIT: HCPCS | Performed by: FAMILY MEDICINE

## 2021-01-01 PROCEDURE — 99497 ADVNCD CARE PLAN 30 MIN: CPT | Performed by: FAMILY MEDICINE

## 2021-01-01 PROCEDURE — 99214 OFFICE O/P EST MOD 30 MIN: CPT | Performed by: FAMILY MEDICINE

## 2021-01-01 PROCEDURE — 93793 ANTICOAG MGMT PT WARFARIN: CPT | Performed by: FAMILY MEDICINE

## 2021-01-01 PROCEDURE — 93294 REM INTERROG EVL PM/LDLS PM: CPT | Performed by: INTERNAL MEDICINE

## 2021-01-01 PROCEDURE — 93306 TTE W/DOPPLER COMPLETE: CPT

## 2021-01-01 PROCEDURE — 99214 OFFICE O/P EST MOD 30 MIN: CPT | Performed by: INTERNAL MEDICINE

## 2021-01-01 PROCEDURE — 85610 PROTHROMBIN TIME: CPT | Performed by: FAMILY MEDICINE

## 2021-01-01 PROCEDURE — 99213 OFFICE O/P EST LOW 20 MIN: CPT | Performed by: FAMILY MEDICINE

## 2021-01-01 PROCEDURE — 71046 X-RAY EXAM CHEST 2 VIEWS: CPT

## 2021-01-01 PROCEDURE — 93296 REM INTERROG EVL PM/IDS: CPT | Performed by: INTERNAL MEDICINE

## 2021-01-01 PROCEDURE — 93000 ELECTROCARDIOGRAM COMPLETE: CPT | Performed by: INTERNAL MEDICINE

## 2021-01-01 PROCEDURE — 1111F DSCHRG MED/CURRENT MED MERGE: CPT | Performed by: FAMILY MEDICINE

## 2021-01-01 PROCEDURE — 93279 PRGRMG DEV EVAL PM/LDLS PM: CPT | Performed by: INTERNAL MEDICINE

## 2021-01-01 PROCEDURE — 81002 URINALYSIS NONAUTO W/O SCOPE: CPT | Performed by: FAMILY MEDICINE

## 2021-01-01 PROCEDURE — 90662 IIV NO PRSV INCREASED AG IM: CPT | Performed by: FAMILY MEDICINE

## 2021-01-01 PROCEDURE — G0008 ADMIN INFLUENZA VIRUS VAC: HCPCS | Performed by: FAMILY MEDICINE

## 2021-01-01 PROCEDURE — 3725F SCREEN DEPRESSION PERFORMED: CPT | Performed by: FAMILY MEDICINE

## 2021-01-01 RX ORDER — AMOXICILLIN 500 MG/1
CAPSULE ORAL
Qty: 4 CAPSULE | Refills: 0 | OUTPATIENT
Start: 2021-01-01

## 2021-01-01 RX ORDER — POTASSIUM CHLORIDE 20 MEQ/1
TABLET, EXTENDED RELEASE ORAL
Qty: 30 TABLET | Refills: 3 | Status: SHIPPED | OUTPATIENT
Start: 2021-01-01 | End: 2022-01-01 | Stop reason: SDUPTHER

## 2021-01-01 RX ORDER — WARFARIN SODIUM 3 MG/1
TABLET ORAL
Qty: 45 TABLET | Refills: 0 | Status: SHIPPED | OUTPATIENT
Start: 2021-01-01 | End: 2022-01-01 | Stop reason: SDUPTHER

## 2021-01-01 RX ORDER — FUROSEMIDE 20 MG/1
TABLET ORAL
Qty: 30 TABLET | Refills: 2 | Status: SHIPPED | OUTPATIENT
Start: 2021-01-01 | End: 2021-01-01

## 2021-01-01 RX ORDER — OMEPRAZOLE 40 MG/1
40 CAPSULE, DELAYED RELEASE ORAL DAILY
COMMUNITY
Start: 2021-01-01

## 2021-01-01 RX ORDER — AMOXICILLIN AND CLAVULANATE POTASSIUM 875; 125 MG/1; MG/1
1 TABLET, FILM COATED ORAL 2 TIMES DAILY
COMMUNITY
End: 2021-01-01

## 2021-01-01 RX ORDER — ALBUTEROL SULFATE 2.5 MG/3ML
2.5 SOLUTION RESPIRATORY (INHALATION) EVERY 4 HOURS PRN
Qty: 540 ML | Refills: 5 | Status: SHIPPED | OUTPATIENT
Start: 2021-01-01 | End: 2021-01-01

## 2021-01-01 RX ORDER — LOSARTAN POTASSIUM 50 MG/1
100 TABLET ORAL DAILY
Qty: 90 TABLET | Refills: 3 | Status: SHIPPED | OUTPATIENT
Start: 2021-01-01 | End: 2021-01-01

## 2021-01-01 RX ORDER — AMOXICILLIN 500 MG/1
CAPSULE ORAL
Qty: 4 CAPSULE | Refills: 0 | Status: SHIPPED | OUTPATIENT
Start: 2021-01-01 | End: 2022-01-01

## 2021-01-01 RX ORDER — LOVASTATIN 20 MG/1
TABLET ORAL
Qty: 180 TABLET | Refills: 0 | Status: SHIPPED | OUTPATIENT
Start: 2021-01-01 | End: 2022-01-01 | Stop reason: SDUPTHER

## 2021-01-01 RX ORDER — FUROSEMIDE 20 MG/1
TABLET ORAL
Qty: 30 TABLET | Refills: 0 | Status: SHIPPED | OUTPATIENT
Start: 2021-01-01 | End: 2022-01-01 | Stop reason: SDUPTHER

## 2021-01-01 RX ORDER — CEPHALEXIN 500 MG/1
500 CAPSULE ORAL 3 TIMES DAILY
Qty: 45 CAPSULE | Refills: 0 | Status: SHIPPED | OUTPATIENT
Start: 2021-01-01 | End: 2021-01-01

## 2021-01-01 RX ORDER — OMEPRAZOLE 40 MG/1
40 CAPSULE, DELAYED RELEASE ORAL DAILY
Qty: 30 CAPSULE | Refills: 5 | Status: SHIPPED | OUTPATIENT
Start: 2021-01-01 | End: 2021-01-01 | Stop reason: ALTCHOICE

## 2021-01-01 RX ORDER — CEFPROZIL 500 MG/1
500 TABLET, FILM COATED ORAL 3 TIMES DAILY
Qty: 30 TABLET | Refills: 0 | Status: SHIPPED | OUTPATIENT
Start: 2021-01-01 | End: 2021-01-01

## 2021-01-15 ENCOUNTER — CLINICAL SUPPORT (OUTPATIENT)
Dept: FAMILY MEDICINE CLINIC | Facility: CLINIC | Age: 86
End: 2021-01-15

## 2021-01-15 VITALS
RESPIRATION RATE: 20 BRPM | TEMPERATURE: 97.1 F | DIASTOLIC BLOOD PRESSURE: 82 MMHG | SYSTOLIC BLOOD PRESSURE: 126 MMHG | HEIGHT: 70 IN | HEART RATE: 52 BPM | OXYGEN SATURATION: 99 % | BODY MASS INDEX: 26.28 KG/M2 | WEIGHT: 183.6 LBS

## 2021-01-15 DIAGNOSIS — E78.2 MIXED HYPERLIPIDEMIA: ICD-10-CM

## 2021-01-15 DIAGNOSIS — Z95.2 HISTORY OF HEART VALVE REPLACEMENT: ICD-10-CM

## 2021-01-15 DIAGNOSIS — I48.21 PERMANENT ATRIAL FIBRILLATION (HCC): ICD-10-CM

## 2021-01-15 DIAGNOSIS — I10 ESSENTIAL HYPERTENSION: ICD-10-CM

## 2021-01-15 LAB — INR PPP: 2.8 (ref 2–3)

## 2021-01-15 PROCEDURE — 93793 ANTICOAG MGMT PT WARFARIN: CPT | Performed by: FAMILY MEDICINE

## 2021-01-15 PROCEDURE — 36416 COLLJ CAPILLARY BLOOD SPEC: CPT | Performed by: FAMILY MEDICINE

## 2021-01-15 PROCEDURE — 85610 PROTHROMBIN TIME: CPT | Performed by: FAMILY MEDICINE

## 2021-01-15 RX ORDER — LOVASTATIN 20 MG/1
40 TABLET ORAL DAILY
Qty: 180 TABLET | Refills: 0 | Status: SHIPPED | OUTPATIENT
Start: 2021-01-15 | End: 2021-04-16 | Stop reason: SDUPTHER

## 2021-01-15 NOTE — PROGRESS NOTES
Coumadin Management:  Jean Pierre Lazo is a 89 y.o. male who presents for coumadin management. He is on anticoagulation due to atrial fibrillation.     The goal INR for this patient is 2.0-3.0. The patient has their INR checked one time per month    Last INR:  Date: 12/18/2020  INR: 3.1     Management changes made at the last visit include none. Symptoms included easy bruising.    Patient Findings     Positives:  Bruising    Negatives:  Signs/symptoms of thrombosis, Signs/symptoms of bleeding,   Laboratory test error suspected, Change in health, Change in alcohol use,   Change in activity, Upcoming invasive procedure, Emergency department   visit, Upcoming dental procedure, Missed doses, Extra doses, Change in   medications, Change in diet/appetite, Hospital admission, Other complaints        Clinical Outcomes     Negatives:  Major bleeding event, Thromboembolic event,   Anticoagulation-related hospital admission, Anticoagulation-related ED   visit, Anticoagulation-related fatality         Today's INR:  INR   Date Value Ref Range Status   01/15/2021 2.80 2 - 3 Final       Current INR dose is 2.5mg on monday.wednesday,and friday. Recent dosing change include unchanged. Repeat INR in 1 month.    By report, Jean Pierre is compliant most of the time.    I wore protective equipment throughout this patient encounter to include mask and eye protection. Hand hygiene was performed before donning protective equipment and after removal when leaving the room.

## 2021-02-15 ENCOUNTER — CLINICAL SUPPORT (OUTPATIENT)
Dept: FAMILY MEDICINE CLINIC | Facility: CLINIC | Age: 86
End: 2021-02-15

## 2021-02-15 VITALS
DIASTOLIC BLOOD PRESSURE: 80 MMHG | OXYGEN SATURATION: 99 % | SYSTOLIC BLOOD PRESSURE: 130 MMHG | HEIGHT: 70 IN | TEMPERATURE: 97.3 F | WEIGHT: 186.6 LBS | RESPIRATION RATE: 18 BRPM | BODY MASS INDEX: 26.71 KG/M2 | HEART RATE: 60 BPM

## 2021-02-15 DIAGNOSIS — I48.21 PERMANENT ATRIAL FIBRILLATION (HCC): Primary | ICD-10-CM

## 2021-02-15 DIAGNOSIS — Z95.2 HISTORY OF HEART VALVE REPLACEMENT: ICD-10-CM

## 2021-02-15 LAB — INR PPP: 2.9 (ref 2–3)

## 2021-02-15 PROCEDURE — 36416 COLLJ CAPILLARY BLOOD SPEC: CPT | Performed by: FAMILY MEDICINE

## 2021-02-15 PROCEDURE — 93793 ANTICOAG MGMT PT WARFARIN: CPT | Performed by: FAMILY MEDICINE

## 2021-02-15 PROCEDURE — 85610 PROTHROMBIN TIME: CPT | Performed by: FAMILY MEDICINE

## 2021-02-15 NOTE — PROGRESS NOTES
Coumadin Management:  Jean Pierre Lazo is a 89 y.o. male who presents for coumadin management. He is on anticoagulation due to atrial fibrillation.     The goal INR for this patient is 2.0-3.0. The patient has their INR checked one time per month    Last INR:  Date: 1/15/2021  INR: 2.8     Management changes made at the last visit include none. Symptoms included easy bruising.    Patient Findings     Positives:  Bruising    Negatives:  Signs/symptoms of thrombosis, Signs/symptoms of bleeding,   Laboratory test error suspected, Change in health, Change in alcohol use,   Change in activity, Upcoming invasive procedure, Emergency department   visit, Upcoming dental procedure, Missed doses, Extra doses, Change in   medications, Change in diet/appetite, Hospital admission, Other complaints        Clinical Outcomes     Negatives:  Major bleeding event, Thromboembolic event,   Anticoagulation-related hospital admission, Anticoagulation-related ED   visit, Anticoagulation-related fatality         Today's INR:  INR   Date Value Ref Range Status   02/15/2021 2.90 2 - 3 Final       Current INR dose is 2.5mg on monday, wednesday, and friday and 3mg on tuesday, thursday, saturday,and sunday.. Recent dosing change include unchanged. Repeat INR in 1 month.    By report, Jean Pierre is compliant all of the time.    I wore protective equipment throughout this patient encounter to include mask and eye protection. Hand hygiene was performed before donning protective equipment and after removal when leaving the room.

## 2021-02-27 PROCEDURE — 93294 REM INTERROG EVL PM/LDLS PM: CPT | Performed by: INTERNAL MEDICINE

## 2021-02-27 PROCEDURE — 93296 REM INTERROG EVL PM/IDS: CPT | Performed by: INTERNAL MEDICINE

## 2021-03-11 ENCOUNTER — TELEPHONE (OUTPATIENT)
Dept: FAMILY MEDICINE CLINIC | Facility: CLINIC | Age: 86
End: 2021-03-11

## 2021-03-12 RX ORDER — HYDROCHLOROTHIAZIDE 12.5 MG/1
TABLET ORAL
Qty: 30 TABLET | Refills: 0 | Status: SHIPPED | OUTPATIENT
Start: 2021-03-12 | End: 2021-04-09 | Stop reason: SDUPTHER

## 2021-03-12 RX ORDER — LOSARTAN POTASSIUM 100 MG/1
TABLET ORAL
Qty: 30 TABLET | Refills: 0 | Status: SHIPPED | OUTPATIENT
Start: 2021-03-12 | End: 2021-03-24 | Stop reason: SDUPTHER

## 2021-03-15 ENCOUNTER — CLINICAL SUPPORT (OUTPATIENT)
Dept: FAMILY MEDICINE CLINIC | Facility: CLINIC | Age: 86
End: 2021-03-15

## 2021-03-15 VITALS
HEIGHT: 70 IN | TEMPERATURE: 97.7 F | WEIGHT: 186.4 LBS | SYSTOLIC BLOOD PRESSURE: 136 MMHG | OXYGEN SATURATION: 99 % | DIASTOLIC BLOOD PRESSURE: 84 MMHG | HEART RATE: 52 BPM | BODY MASS INDEX: 26.69 KG/M2 | RESPIRATION RATE: 20 BRPM

## 2021-03-15 DIAGNOSIS — Z95.2 HISTORY OF MITRAL VALVE REPLACEMENT: ICD-10-CM

## 2021-03-15 DIAGNOSIS — I48.21 PERMANENT ATRIAL FIBRILLATION (HCC): Primary | ICD-10-CM

## 2021-03-15 LAB — INR PPP: 2.5 (ref 2–3)

## 2021-03-15 PROCEDURE — 93793 ANTICOAG MGMT PT WARFARIN: CPT | Performed by: FAMILY MEDICINE

## 2021-03-15 PROCEDURE — 85610 PROTHROMBIN TIME: CPT | Performed by: FAMILY MEDICINE

## 2021-03-15 PROCEDURE — 36416 COLLJ CAPILLARY BLOOD SPEC: CPT | Performed by: FAMILY MEDICINE

## 2021-03-15 RX ORDER — WARFARIN SODIUM 3 MG/1
3 TABLET ORAL DAILY
Qty: 30 TABLET | Refills: 1 | Status: SHIPPED | OUTPATIENT
Start: 2021-03-15 | End: 2021-01-01

## 2021-03-15 NOTE — PROGRESS NOTES
Coumadin Management:  Jean Pierre Lazo is a 89 y.o. male who presents for coumadin management. He is on anticoagulation due to atrial fibrillation.     The goal INR for this patient is 2.0-3.0. The patient has their INR checked one time per month    Last INR:  Date: 2/15/21  INR: 2.9     Management changes made at the last visit include none. Symptoms included easy bruising.    Patient Findings     Positives:  Bruising    Negatives:  Signs/symptoms of thrombosis, Signs/symptoms of bleeding,   Laboratory test error suspected, Change in health, Change in alcohol use,   Change in activity, Upcoming invasive procedure, Emergency department   visit, Upcoming dental procedure, Missed doses, Extra doses, Change in   medications, Change in diet/appetite, Hospital admission, Other complaints        Clinical Outcomes     Negatives:  Major bleeding event, Thromboembolic event,   Anticoagulation-related hospital admission, Anticoagulation-related ED   visit, Anticoagulation-related fatality         Today's INR:  INR   Date Value Ref Range Status   03/15/2021 2.50 2 - 3 Final       Current INR dose is 2.5mg monday, wednesday, and friday. 3mg tuesday,thursday,saturday, and sunday.. Recent dosing change include unchanged. Repeat INR in 1 month.    By report, Jean Pierre is compliant most of the time.    I wore protective equipment throughout this patient encounter to include mask and eye protection. Hand hygiene was performed before donning protective equipment and after removal when leaving the room.

## 2021-03-24 ENCOUNTER — OFFICE VISIT (OUTPATIENT)
Dept: CARDIOLOGY | Facility: CLINIC | Age: 86
End: 2021-03-24

## 2021-03-24 ENCOUNTER — CLINICAL SUPPORT NO REQUIREMENTS (OUTPATIENT)
Dept: CARDIOLOGY | Facility: CLINIC | Age: 86
End: 2021-03-24

## 2021-03-24 VITALS
WEIGHT: 181 LBS | SYSTOLIC BLOOD PRESSURE: 164 MMHG | BODY MASS INDEX: 25.91 KG/M2 | TEMPERATURE: 97.3 F | OXYGEN SATURATION: 99 % | HEART RATE: 58 BPM | HEIGHT: 70 IN | DIASTOLIC BLOOD PRESSURE: 92 MMHG

## 2021-03-24 DIAGNOSIS — I05.9 MITRAL VALVE DISEASE: ICD-10-CM

## 2021-03-24 DIAGNOSIS — I48.21 PERMANENT ATRIAL FIBRILLATION (HCC): ICD-10-CM

## 2021-03-24 DIAGNOSIS — E78.2 MIXED HYPERLIPIDEMIA: ICD-10-CM

## 2021-03-24 DIAGNOSIS — I44.30 ATRIOVENTRICULAR BLOCK: Primary | ICD-10-CM

## 2021-03-24 DIAGNOSIS — Z95.0 PRESENCE OF CARDIAC PACEMAKER: ICD-10-CM

## 2021-03-24 DIAGNOSIS — I25.10 CORONARY ARTERY DISEASE INVOLVING NATIVE CORONARY ARTERY OF NATIVE HEART WITHOUT ANGINA PECTORIS: ICD-10-CM

## 2021-03-24 DIAGNOSIS — I10 ESSENTIAL HYPERTENSION: ICD-10-CM

## 2021-03-24 PROCEDURE — 99214 OFFICE O/P EST MOD 30 MIN: CPT | Performed by: INTERNAL MEDICINE

## 2021-03-24 PROCEDURE — 93279 PRGRMG DEV EVAL PM/LDLS PM: CPT | Performed by: INTERNAL MEDICINE

## 2021-03-24 NOTE — PROGRESS NOTES
"    Subjective:     Encounter Date:03/24/2021      Patient ID: Jean Pierre Lazo is a 89 y.o. male.    Chief Complaint:  History of Present Illness 89-year-old white male with history of coronary disease history of mitral valve disorder status post mitral valve replacement with a mechanical valve hypertension hyperlipidemia atrial fibrillation history of tachybradycardia syndrome status post pacemaker placement presents to my office for follow-up.  Patient is currently stable with no chest pain but has some shortness of breath with exertion.  No complains any PND orthopnea.  No palpitation dizziness syncope or swelling of the feet.  Patient has been taking all his medicines regularly.  He is quite active.  He does not smoke.  Patient's pacemaker is being checked frequently at my office and is working very well  Patient has been taking precautions for his endocarditis prophylaxis also.    The following portions of the patient's history were reviewed and updated as appropriate: allergies, current medications, past family history, past medical history, past social history, past surgical history and problem list.  Past Medical History:   Diagnosis Date   • Atrial fibrillation (CMS/HCC)    • Coronary artery disease    • History of heart valve replacement    • Hyperlipidemia    • Hypertension      Past Surgical History:   Procedure Laterality Date   • CARDIAC CATHETERIZATION     • CARDIAC VALVE SURGERY     • CHOLECYSTECTOMY     • PACEMAKER IMPLANTATION       /92 (BP Location: Right arm, Patient Position: Sitting)   Pulse 58   Temp 97.3 °F (36.3 °C)   Ht 177.8 cm (70\")   Wt 82.1 kg (181 lb)   SpO2 99%   BMI 25.97 kg/m²   History reviewed. No pertinent family history.    Current Outpatient Medications:   •  hydroCHLOROthiazide (HYDRODIURIL) 12.5 MG tablet, Take 1 tablet by mouth once daily, Disp: 30 tablet, Rfl: 0  •  losartan (COZAAR) 100 MG tablet, Take 1 tablet by mouth Daily for 30 days., Disp: 90 tablet, Rfl: " Patient discharged home with eye drops. 3  •  lovastatin (MEVACOR) 20 MG tablet, Take 2 tablets by mouth Daily., Disp: 180 tablet, Rfl: 0  •  metoprolol tartrate (LOPRESSOR) 25 MG tablet, Take 1 tablet by mouth 2 (Two) Times a Day., Disp: 180 tablet, Rfl: 2  •  Multiple Vitamins-Minerals (MULTI VITAMIN/MINERALS) tablet, MULTI VITAMIN/MINERALS TABS, Disp: , Rfl:   •  niacin 500 MG tablet, Take 500 mg by mouth Every Night., Disp: , Rfl:   •  warfarin (COUMADIN) 3 MG tablet, Take 1 tablet by mouth Daily., Disp: 30 tablet, Rfl: 1  No Known Allergies  Social History     Socioeconomic History   • Marital status:      Spouse name: Not on file   • Number of children: Not on file   • Years of education: Not on file   • Highest education level: Not on file   Tobacco Use   • Smoking status: Never Smoker   • Smokeless tobacco: Never Used   Substance and Sexual Activity   • Alcohol use: No   • Drug use: No   • Sexual activity: Defer     Review of Systems   Constitutional: Negative for fever and malaise/fatigue.   HENT: Negative for ear pain and nosebleeds.    Eyes: Negative for blurred vision and double vision.   Cardiovascular: Negative for chest pain, dyspnea on exertion and palpitations.   Respiratory: Positive for shortness of breath. Negative for cough.    Skin: Negative for rash.   Musculoskeletal: Negative for joint pain.   Gastrointestinal: Negative for abdominal pain, nausea and vomiting.   Neurological: Negative for focal weakness and headaches.   Psychiatric/Behavioral: Negative for depression. The patient is not nervous/anxious.    All other systems reviewed and are negative.             Objective:     Constitutional:       Appearance: Well-developed.   Eyes:      General: No scleral icterus.     Conjunctiva/sclera: Conjunctivae normal.      Pupils: Pupils are equal, round, and reactive to light.   HENT:      Head: Normocephalic and atraumatic.   Neck:      Vascular: No carotid bruit or JVD.   Pulmonary:      Effort: Pulmonary effort is normal.       Breath sounds: Normal breath sounds. No wheezing. No rales.   Cardiovascular:      Normal rate. Regular rhythm.   Pulses:     Intact distal pulses.   Abdominal:      General: Bowel sounds are normal.      Palpations: Abdomen is soft.   Musculoskeletal: Normal range of motion.      Cervical back: Normal range of motion and neck supple. Skin:     General: Skin is warm and dry.      Findings: No rash.   Neurological:      Mental Status: Alert.      Comments: No focal deficits       Procedures    Lab Review:         MDM   1 coronary disease  Patient has nonobstructive disease now with normal be systolic function is currently stable on medications  2.  History of mitral valve disorder  Patient had a mitral valve replacement with a mechanical valve and he is taking warfarin keep his INR between 3-3.5  Patient also takes endocarditis prophylaxis.  3.  Hypertension  Patient blood pressure currently stable on medications including losartan and metoprolol  4.  Hyperlipidemia  Patient is currently on niacin and his lipid levels are followed by the primary care doctor  5.  Atrial fibrillation  Patient has permanent atrial fibrillation is on metoprolol for rate control and warfarin  6.  History of tachybradycardia syndrome status post pacemaker placement  Patient's pacemaker is working very well.

## 2021-04-09 RX ORDER — LOSARTAN POTASSIUM 100 MG/1
100 TABLET ORAL DAILY
Qty: 90 TABLET | Refills: 3 | Status: SHIPPED | OUTPATIENT
Start: 2021-04-09 | End: 2021-01-01 | Stop reason: SDUPTHER

## 2021-04-09 RX ORDER — HYDROCHLOROTHIAZIDE 12.5 MG/1
12.5 TABLET ORAL DAILY
Qty: 30 TABLET | Refills: 0 | Status: SHIPPED | OUTPATIENT
Start: 2021-04-09 | End: 2021-01-01 | Stop reason: ALTCHOICE

## 2021-04-09 NOTE — TELEPHONE ENCOUNTER
Caller: AARONHENRY    Relationship: Emergency Contact    Best call back number: [phone]    Medication needed:   Requested Prescriptions     Pending Prescriptions Disp Refills   • hydroCHLOROthiazide (HYDRODIURIL) 12.5 MG tablet 30 tablet 0     Sig: Take 1 tablet by mouth Daily.   • losartan (COZAAR) 100 MG tablet 90 tablet 3     Sig: Take 1 tablet by mouth Daily for 30 days.     What is the patient's [place] [address]  [address] [facility] [address] [facility] [phone] [facility] [phone] FX

## 2021-04-16 ENCOUNTER — CLINICAL SUPPORT (OUTPATIENT)
Dept: FAMILY MEDICINE CLINIC | Facility: CLINIC | Age: 86
End: 2021-04-16

## 2021-04-16 VITALS
HEART RATE: 60 BPM | SYSTOLIC BLOOD PRESSURE: 154 MMHG | OXYGEN SATURATION: 98 % | DIASTOLIC BLOOD PRESSURE: 87 MMHG | BODY MASS INDEX: 25.77 KG/M2 | TEMPERATURE: 97.7 F | WEIGHT: 180 LBS | HEIGHT: 70 IN | RESPIRATION RATE: 18 BRPM

## 2021-04-16 DIAGNOSIS — E78.2 MIXED HYPERLIPIDEMIA: ICD-10-CM

## 2021-04-16 DIAGNOSIS — I48.21 PERMANENT ATRIAL FIBRILLATION (HCC): Primary | ICD-10-CM

## 2021-04-16 LAB — INR PPP: 2.6 (ref 2–3)

## 2021-04-16 PROCEDURE — 85610 PROTHROMBIN TIME: CPT | Performed by: FAMILY MEDICINE

## 2021-04-16 PROCEDURE — 93793 ANTICOAG MGMT PT WARFARIN: CPT | Performed by: FAMILY MEDICINE

## 2021-04-16 PROCEDURE — 36416 COLLJ CAPILLARY BLOOD SPEC: CPT | Performed by: FAMILY MEDICINE

## 2021-04-16 RX ORDER — LOVASTATIN 20 MG/1
40 TABLET ORAL DAILY
Qty: 180 TABLET | Refills: 1 | Status: SHIPPED | OUTPATIENT
Start: 2021-04-16 | End: 2021-01-01

## 2021-04-16 NOTE — PROGRESS NOTES
Coumadin Management:  Jean Pierre Lazo is a 89 y.o. male who presents for coumadin management. He is on anticoagulation due to atrial fibrillation.     The goal INR for this patient is 2.0-3.0. The patient has their INR checked one time per month    Last INR:  Date: 3/15/2021  INR: 2.5     Management changes made at the last visit include none. Symptoms included easy bruising.    Patient Findings     Positives:  Bruising    Negatives:  Signs/symptoms of thrombosis, Signs/symptoms of bleeding,   Laboratory test error suspected, Change in health, Change in alcohol use,   Change in activity, Upcoming invasive procedure, Emergency department   visit, Upcoming dental procedure, Missed doses, Extra doses, Change in   medications, Change in diet/appetite, Hospital admission, Other complaints        Clinical Outcomes     Negatives:  Major bleeding event, Thromboembolic event,   Anticoagulation-related hospital admission, Anticoagulation-related ED   visit, Anticoagulation-related fatality         Today's INR:  INR   Date Value Ref Range Status   04/16/2021 2.60 2 - 3 Final       Current INR dose is 2.5mg on monday,wednesday,and friday and 3mg all other days of the week. Recent dosing change include unchanged. Repeat INR in 1 month.    By report, Jean Pierre is compliant all of the time.    I wore protective equipment throughout this patient encounter to include mask and eye protection. Hand hygiene was performed before donning protective equipment and after removal when leaving the room.

## 2021-04-23 RX ORDER — WARFARIN SODIUM 1 MG/1
1-3 TABLET ORAL NIGHTLY
Qty: 270 TABLET | Refills: 0 | Status: SHIPPED | OUTPATIENT
Start: 2021-04-23 | End: 2021-01-01 | Stop reason: DRUGHIGH

## 2021-04-23 RX ORDER — WARFARIN SODIUM 1 MG/1
TABLET ORAL
Qty: 270 TABLET | Refills: 0 | Status: SHIPPED | OUTPATIENT
Start: 2021-04-23 | End: 2021-04-23 | Stop reason: SDUPTHER

## 2021-04-23 NOTE — TELEPHONE ENCOUNTER
Caller: HENRY WALKER    Relationship: Emergency Contact    Best call back number: [phone]    Medication needed:   Requested Prescriptions     Pending Prescriptions Disp Refills   • warfarin (COUMADIN) 1 MG tablet 270 tablet 0       When do you need the refill by: 4/23/21      Does the patient have less than a 3 day supply:  [] Yes  [x] No    What is the patient's preferred pharmacy: [facility] [address] [place], [place] [address] - [address] [facility] [address]  - [phone] [facility] [phone] FX

## 2021-05-14 ENCOUNTER — CLINICAL SUPPORT (OUTPATIENT)
Dept: FAMILY MEDICINE CLINIC | Facility: CLINIC | Age: 86
End: 2021-05-14

## 2021-05-14 VITALS
WEIGHT: 185 LBS | OXYGEN SATURATION: 98 % | DIASTOLIC BLOOD PRESSURE: 80 MMHG | HEART RATE: 62 BPM | TEMPERATURE: 97.8 F | RESPIRATION RATE: 18 BRPM | SYSTOLIC BLOOD PRESSURE: 118 MMHG | BODY MASS INDEX: 26.48 KG/M2 | HEIGHT: 70 IN

## 2021-05-14 DIAGNOSIS — I48.21 PERMANENT ATRIAL FIBRILLATION (HCC): Primary | ICD-10-CM

## 2021-05-14 DIAGNOSIS — I44.30 ATRIOVENTRICULAR BLOCK: ICD-10-CM

## 2021-05-14 LAB — INR PPP: 2.7 (ref 2–3)

## 2021-05-14 PROCEDURE — 85610 PROTHROMBIN TIME: CPT | Performed by: FAMILY MEDICINE

## 2021-05-14 PROCEDURE — 36416 COLLJ CAPILLARY BLOOD SPEC: CPT | Performed by: FAMILY MEDICINE

## 2021-05-14 PROCEDURE — 93793 ANTICOAG MGMT PT WARFARIN: CPT | Performed by: FAMILY MEDICINE

## 2021-05-17 NOTE — PROGRESS NOTES
Coumadin Management:  Jean Pierre Lazo is a 89 y.o. male who presents for coumadin management. He is on anticoagulation due to atrial fibrillation.     The goal INR for this patient is 2.0-3.0. The patient has their INR checked 1 time per month    Symptoms included easy bruising.    Patient Findings     Positives:  Bruising    Negatives:  Signs/symptoms of thrombosis, Signs/symptoms of bleeding,   Laboratory test error suspected, Change in health, Change in alcohol use,   Change in activity, Upcoming invasive procedure, Emergency department   visit, Upcoming dental procedure, Missed doses, Extra doses, Change in   medications, Change in diet/appetite, Hospital admission, Other complaints        Clinical Outcomes     Negatives:  Major bleeding event, Thromboembolic event,   Anticoagulation-related hospital admission, Anticoagulation-related ED   visit, Anticoagulation-related fatality         Last INR:  Date: 4/16/2021  INR: 2.6  Warfarin change at last visit unchanged    Today's INR:  INR   Date Value Ref Range Status   05/14/2021 2.70 2 - 3 Final       Present warfarin dose is warfarin 3 mg PO daily, except warfarin 2.5 mg PO on mondays,wednesdays, and fridays. . Dosing change include unchanged. Repeat INR in 1 month.    By report, Jean Pierre is compliant all of the time.

## 2021-05-29 PROCEDURE — 93296 REM INTERROG EVL PM/IDS: CPT | Performed by: INTERNAL MEDICINE

## 2021-05-29 PROCEDURE — 93294 REM INTERROG EVL PM/LDLS PM: CPT | Performed by: INTERNAL MEDICINE

## 2021-06-21 NOTE — TELEPHONE ENCOUNTER
"Feeling bad for about 1 week. C/o SOB, dizziness when standing. Decreased appetite. Call transferred to Dr Cage office, Diane took the call.    Reason for Disposition  • [1] MILD difficulty breathing (e.g., minimal/no SOB at rest, SOB with walking, pulse <100) AND [2] NEW-onset or WORSE than normal    Additional Information  • Negative: [1] Breathing stopped AND [2] hasn't returned  • Negative: Choking on something  • Negative: Severe difficulty breathing (e.g., struggling for each breath, speaks in single words)  • Negative: Bluish (or gray) lips or face now  • Negative: Difficult to awaken or acting confused (e.g., disoriented, slurred speech)  • Negative: Passed out (i.e., lost consciousness, collapsed and was not responding)  • Negative: Wheezing started suddenly after medicine, an allergic food or bee sting  • Negative: Stridor  • Negative: Slow, shallow and weak breathing  • Negative: Sounds like a life-threatening emergency to the triager  • Negative: Chest pain  • Negative: [1] Wheezing (high pitched whistling sound) AND [2] previous asthma attacks or use of asthma medicines  • Negative: [1] Difficulty breathing AND [2] only present when coughing  • Negative: [1] Difficulty breathing AND [2] only from stuffy or runny nose  • Negative: [1] Difficulty breathing AND [2] within 14 days of COVID-19 Exposure  • Negative: [1] MODERATE difficulty breathing (e.g., speaks in phrases, SOB even at rest, pulse 100-120) AND [2] NEW-onset or WORSE than normal  • Negative: Wheezing can be heard across the room  • Negative: Drooling or spitting out saliva (because can't swallow)  • Negative: History of prior \"blood clot\" in leg or lungs (i.e., deep vein thrombosis, pulmonary embolism)  • Negative: History of inherited increased risk of blood clots (e.g., Factor 5 Leiden, Anti-thrombin 3, Protein C or Protein S deficiency, Prothrombin mutation)  • Negative: Major surgery in the past month  • Negative: Hip or leg fracture " "(broken bone) in past month (or had cast on leg or ankle in past month)  • Negative: Illness requiring prolonged bedrest in past month (e.g., immobilization, long hospital stay)  • Negative: Long-distance travel in past month (e.g., car, bus, train, plane; with trip lasting 6 or more hours)  • Negative: Extra heart beats OR irregular heart beating   (i.e., \"palpitations\")  • Negative: Fever > 103 F (39.4 C)  • Negative: [1] Fever > 101 F (38.3 C) AND [2] age > 60  • Negative: [1] Fever > 100.0 F (37.8 C) AND [2] bedridden (e.g., nursing home patient, CVA, chronic illness, recovering from surgery)  • Negative: [1] Fever > 100.0 F (37.8 C) AND [2] diabetes mellitus or weak immune system (e.g., HIV positive, cancer chemo, splenectomy, organ transplant, chronic steroids)  • Negative: [1] Periods where breathing stops and then resumes normally AND [2] bedridden (e.g., nursing home patient, CVA)  • Negative: Pregnant or postpartum (< 1 month since delivery)  • Negative: Patient sounds very sick or weak to the triager    Answer Assessment - Initial Assessment Questions  1. RESPIRATORY STATUS: \"Describe your breathing?\" (e.g., wheezing, shortness of breath, unable to speak, severe coughing)       SOB for about 1 week  2. ONSET: \"When did this breathing problem begin?\"   About 1 week  3. PATTERN \"Does the difficult breathing come and go, or has it been constant since it started?\"     Comes and goes  4. SEVERITY: \"How bad is your breathing?\" (e.g., mild, moderate, severe)     - MILD: No SOB at rest, mild SOB with walking, speaks normally in sentences, can lay down, no retractions, pulse < 100.     - MODERATE: SOB at rest, SOB with minimal exertion and prefers to sit, cannot lie down flat, speaks in phrases, mild retractions, audible wheezing, pulse 100-120.     - SEVERE: Very SOB at rest, speaks in single words, struggling to breathe, sitting hunched forward, retractions, pulse > 120       moderate  5. RECURRENT SYMPTOM: " "\"Have you had difficulty breathing before?\" If so, ask: \"When was the last time?\" and \"What happened that time?\"       *No Answer*  6. CARDIAC HISTORY: \"Do you have any history of heart disease?\" (e.g., heart attack, angina, bypass surgery, angioplasty)       CABG, pacemaker  7. LUNG HISTORY: \"Do you have any history of lung disease?\"  (e.g., pulmonary embolus, asthma, emphysema)      *No Answer*  8. CAUSE: \"What do you think is causing the breathing problem?\"   Not sure  9. OTHER SYMPTOMS: \"Do you have any other symptoms? (e.g., dizziness, runny nose, cough, chest pain, fever)  dizzy  10. PREGNANCY: \"Is there any chance you are pregnant?\" \"When was your last menstrual period?\"  na  11. TRAVEL: \"Have you traveled out of the country in the last month?\" (e.g., travel history, exposures)   na    Protocols used: BREATHING DIFFICULTY-ADULT-AH      "

## 2021-06-22 NOTE — TELEPHONE ENCOUNTER
----- Message from Zeke Cage MD sent at 6/21/2021  4:39 PM EDT -----  Lets him know his UA does show sign of infection in his prostate, this may be causing his symptoms, want him to complete his antibiotics as planned, his chest x-ray looks good, no sign of pneumonia, blood work looks good, blood count, electrolytes all normal, no sign of heart attack, continue plan, keep his follow-up later this week as planned, thanks

## 2021-07-06 NOTE — TELEPHONE ENCOUNTER
Tried calling neena and luiz on the numbers listed in his chart. We would have preferred to see him in office. We seen him 6/25 for this also and sounds like he may not be getting better.

## 2021-07-06 NOTE — TELEPHONE ENCOUNTER
Caller: HENRY WALKER    Relationship to patient: Emergency Contact    Best call back number: [phone]    Chief complaint: SHORTNESS OF BREATH/WEAK/COUGHING/FEVER    Patient directed to call 911 or go to their nearest emergency room.     Patient verbalized understanding: [x] Yes  [] No  If no, why?    Additional notes:

## 2021-07-12 PROBLEM — C18.9 MALIGNANT NEOPLASM OF COLON (HCC): Status: ACTIVE | Noted: 2021-01-01

## 2021-07-12 NOTE — PROGRESS NOTES
Coumadin Management:  Jean Pierre Lazo is a 89 y.o. male who presents for coumadin management. He is on anticoagulation due to atrial fibrillation.     The goal INR for this patient is 2.0-3.0. The patient has their INR checked 2 times per month    Symptoms included easy bruising and unsteady gait.    Patient Findings     Positives:  Change in health, Emergency department visit, Hospital   admission, Bruising    Negatives:  Signs/symptoms of thrombosis, Signs/symptoms of bleeding,   Laboratory test error suspected, Change in alcohol use, Change in   activity, Upcoming invasive procedure, Upcoming dental procedure, Missed   doses, Extra doses, Change in medications, Change in diet/appetite, Other   complaints      Clinical Outcomes     Negatives:  Major bleeding event, Thromboembolic event,   Anticoagulation-related hospital admission, Anticoagulation-related ED   visit, Anticoagulation-related fatality         Last INR:  Date: 6/21/2021  INR: 2.1  Warfarin change at last visit unchanged    Today's INR:  INR   Date Value Ref Range Status   07/12/2021 4.80 (A) 2 - 3 Final       Present warfarin dose is warfarin 3 mg PO daily, except warfarin 2.5 mg PO on mondays,wednesdays, and fridays.. . Dosing change include decreased to warfarin 1.5 mg PO daily, except warfarin 1 mg PO on mondays,wednesdays, and fridays while on antibiotic.. Jean Pierre notified us he just got out of [facility] and they put him on a strong antibiotic. Repeat INR in 4 days at hospital follow up we scheduled today.    By report, Jean Pierre is compliant all of the time.

## 2021-07-16 PROBLEM — Z09 HOSPITAL DISCHARGE FOLLOW-UP: Status: ACTIVE | Noted: 2021-01-01

## 2021-07-16 PROBLEM — R06.02 SHORTNESS OF BREATH: Status: ACTIVE | Noted: 2021-01-01

## 2021-08-16 NOTE — PROGRESS NOTES
Subjective   Jean Pierre Lazo is a 89 y.o. male.     Chief Complaint   Patient presents with   • Shortness of Breath   • Hypertension   • Hyperlipidemia   • Atrial Fibrillation       Jean Pierre is on Coumadin 2.5 mgs Monday/Wednesday/Friday and 3mg all other days of the week todays INR was 4.1 last INR was 7/16 and was 2.5    Shortness of Breath  This is a new problem. The current episode started 1 to 4 weeks ago. The problem occurs constantly. The problem has been unchanged. Pertinent negatives include no abdominal pain, chest pain, claudication, coryza, ear pain, fever, headaches, hemoptysis, leg pain, leg swelling, neck pain, orthopnea, PND, rash, rhinorrhea, sore throat, sputum production, swollen glands, syncope, vomiting or wheezing. The symptoms are aggravated by exercise and any activity. Associated symptoms comments: Feet swelling  . The patient has no known risk factors for DVT/PE. He has tried nothing for the symptoms.   Hypertension  This is a chronic problem. The current episode started more than 1 year ago. The problem is unchanged. The problem is controlled. Associated symptoms include shortness of breath. Pertinent negatives include no blurred vision, chest pain, headaches, neck pain, orthopnea, palpitations or PND. Risk factors for coronary artery disease include male gender and dyslipidemia. Current antihypertension treatment includes angiotensin blockers, diuretics and beta blockers. The current treatment provides moderate improvement. There are no compliance problems.  Hypertensive end-organ damage includes CAD/MI and CVA.   Hyperlipidemia  This is a chronic problem. The current episode started more than 1 year ago. He has no history of diabetes or obesity. Factors aggravating his hyperlipidemia include beta blockers. Associated symptoms include myalgias and shortness of breath. Pertinent negatives include no chest pain or leg pain. Current antihyperlipidemic treatment includes statins. The current  treatment provides moderate improvement of lipids. There are no compliance problems.  Risk factors for coronary artery disease include hypertension, dyslipidemia and male sex.   Atrial Fibrillation  Presents for follow-up visit. Symptoms include hypertension, shortness of breath and weakness. Symptoms are negative for chest pain, palpitations and syncope. The symptoms have been stable (permanent). Past medical history includes atrial fibrillation and hyperlipidemia.            I personally reviewed and updated the patient's allergies, medications, problem list, and past medical, surgical, social, and family history. I have reviewed and confirmed the accuracy of the History of Present Illness and Review of Symptoms as documented by the MA/LPN/RN. Zeke Cage MD    Family History   Problem Relation Age of Onset   • No Known Problems Mother    • No Known Problems Father    • No Known Problems Sister    • No Known Problems Brother        Social History     Tobacco Use   • Smoking status: Never Smoker   • Smokeless tobacco: Never Used   Vaping Use   • Vaping Use: Never used   Substance Use Topics   • Alcohol use: No   • Drug use: No       Past Surgical History:   Procedure Laterality Date   • CARDIAC CATHETERIZATION     • CARDIAC VALVE SURGERY     • CHOLECYSTECTOMY     • PACEMAKER IMPLANTATION         Patient Active Problem List   Diagnosis   • Permanent atrial fibrillation (CMS/HCC)   • Atrioventricular block   • Cerebrovascular accident (CVA) (CMS/HCC)   • Cholelithiasis with cholecystitis   • Coronary artery disease   • History of heart valve replacement   • Mixed hyperlipidemia   • Essential hypertension   • Mitral valve disease   • Presence of aortocoronary bypass graft   • Presence of cardiac pacemaker   • Medicare annual wellness visit, subsequent   • Bilateral impacted cerumen   • Actinic keratosis   • Eye redness   • Subconjunctival hemorrhage of right eye   • Malignant neoplasm of colon (CMS/HCC)   •  Hospital discharge follow-up   • Shortness of breath         Current Outpatient Medications:   •  albuterol (PROVENTIL) (2.5 MG/3ML) 0.083% nebulizer solution, Take 2.5 mg by nebulization Every 4 (Four) Hours As Needed for Wheezing., Disp: 540 mL, Rfl: 5  •  amoxicillin-clavulanate (AUGMENTIN) 875-125 MG per tablet, Take 1 tablet by mouth 2 (Two) Times a Day. Started by hospitalist at [facility], Disp: , Rfl:   •  hydroCHLOROthiazide (HYDRODIURIL) 12.5 MG tablet, Take 1 tablet by mouth Daily., Disp: 30 tablet, Rfl: 0  •  lovastatin (MEVACOR) 20 MG tablet, Take 2 tablets by mouth Daily., Disp: 180 tablet, Rfl: 1  •  metoprolol tartrate (LOPRESSOR) 25 MG tablet, Take 1 tablet by mouth 2 (Two) Times a Day., Disp: 180 tablet, Rfl: 2  •  Multiple Vitamins-Minerals (MULTI VITAMIN/MINERALS) tablet, MULTI VITAMIN/MINERALS TABS, Disp: , Rfl:   •  niacin 500 MG tablet, Take 500 mg by mouth Every Night., Disp: , Rfl:   •  omeprazole (priLOSEC) 40 MG capsule, Take 1 capsule by mouth Daily., Disp: 30 capsule, Rfl: 5  •  warfarin (COUMADIN) 1 MG tablet, Take 1-3 tablets by mouth Every Night., Disp: 270 tablet, Rfl: 0  •  warfarin (COUMADIN) 3 MG tablet, Take 1 tablet by mouth Daily., Disp: 30 tablet, Rfl: 1  •  losartan (COZAAR) 100 MG tablet, Take 1 tablet by mouth Daily for 30 days., Disp: 90 tablet, Rfl: 3         Review of Systems   Constitutional: Negative for chills, diaphoresis and fever.   HENT: Negative for ear pain, rhinorrhea, sore throat and swollen glands.    Eyes: Negative for blurred vision and visual disturbance.   Respiratory: Positive for shortness of breath. Negative for hemoptysis, sputum production and wheezing.    Cardiovascular: Negative for chest pain, palpitations, orthopnea, claudication, leg swelling, syncope and PND.   Gastrointestinal: Negative for abdominal pain, nausea and vomiting.   Endocrine: Negative for polydipsia and polyphagia.   Musculoskeletal: Positive for myalgias. Negative for neck pain and  "neck stiffness.   Skin: Negative for color change, pallor and rash.   Neurological: Positive for weakness. Negative for seizures and syncope.   Hematological: Negative for adenopathy.       I have reviewed and confirmed the accuracy of the ROS as documented by the MA/LPN/RN Zeke Cage MD      Objective   /80   Pulse 59   Temp 97.3 °F (36.3 °C)   Resp 20   Ht 177.8 cm (70\")   Wt 83.2 kg (183 lb 6.4 oz)   SpO2 96%   BMI 26.32 kg/m²   BP Readings from Last 3 Encounters:   08/16/21 140/80   07/12/21 162/82   06/25/21 132/82     Wt Readings from Last 3 Encounters:   08/16/21 83.2 kg (183 lb 6.4 oz)   07/16/21 83.3 kg (183 lb 9.6 oz)   07/12/21 81.6 kg (180 lb)     Physical Exam  Constitutional:       Appearance: Normal appearance. He is well-developed. He is not diaphoretic.   Cardiovascular:      Rate and Rhythm: Normal rate and regular rhythm.      Pulses: Normal pulses.      Heart sounds: Normal heart sounds, S1 normal and S2 normal. No murmur heard.   No friction rub. No gallop.    Pulmonary:      Effort: Pulmonary effort is normal. No accessory muscle usage.      Breath sounds: Normal breath sounds. No stridor. No decreased breath sounds, wheezing, rhonchi or rales.   Abdominal:      General: Bowel sounds are normal. There is no distension.      Palpations: Abdomen is soft. Abdomen is not rigid. There is no mass or pulsatile mass.      Tenderness: There is no abdominal tenderness. There is no guarding or rebound. Negative signs include Daigle's sign.      Hernia: No hernia is present.   Skin:     General: Skin is warm and dry.      Coloration: Skin is not pale.   Neurological:      Mental Status: He is alert and oriented to person, place, and time.      Cranial Nerves: No cranial nerve deficit.      Sensory: No sensory deficit.      Motor: No tremor, abnormal muscle tone or seizure activity.      Coordination: Coordination normal.      Gait: Gait normal.      Deep Tendon Reflexes: Reflexes are " normal and symmetric.         Data / Lab Results:    Hemoglobin A1C   Date Value Ref Range Status   06/06/2016 4.8 4.6 - 7.1 % Final     Lab Results   Component Value Date    GLU 94 10/09/2020     Lab Results   Component Value Date    LDL 69 10/09/2020    LDL 71 08/02/2019    LDL 71 07/20/2018     Lab Results   Component Value Date    CHOL 136 07/20/2018    CHOL 166 07/17/2017    CHOL 157 05/03/2016     Lab Results   Component Value Date    TRIG 125 10/09/2020    TRIG 101 08/02/2019    TRIG 94 07/20/2018     Lab Results   Component Value Date    HDL 48 10/09/2020    HDL 53 08/02/2019    HDL 46 07/20/2018     No results found for: PSA  Lab Results   Component Value Date    WBC 6.7 10/09/2020    HGB 13.1 10/09/2020    HCT 38.4 10/09/2020    MCV 93 10/09/2020     (L) 10/09/2020     Lab Results   Component Value Date    TSH 1.700 10/09/2020      Lab Results   Component Value Date    GLUCOSE 101 (H) 03/29/2019    BUN 32 10/09/2020    CREATININE 1.37 (H) 10/09/2020    EGFRIFNONA 45 (L) 10/09/2020    EGFRIFAFRI 52 (L) 10/09/2020    BCR 23 10/09/2020    K 4.4 10/09/2020    CO2 24 10/09/2020    CALCIUM 9.5 10/09/2020    PROTENTOTREF 6.2 10/09/2020    ALBUMIN 4.0 10/09/2020    LABIL2 1.8 10/09/2020    AST 30 10/09/2020    ALT 10 10/09/2020     No results found for: DAVID, RF, SEDRATE   No results found for: CRP   No results found for: IRON, TIBC, FERRITIN   Lab Results   Component Value Date    TNQBLYVC85 485 08/02/2016          Assessment/Plan      Medications        Problem List         LOS        Fatigue/leg weakness.       Persistent symptoms today, has completed Rx for bronchitis/bacterial prostatitis..  DDx includes CHF exacerbation, recheck blood work/chest x-ray/cardiology follow-up with Dr. Graff scheduled..    Status post inpatient stay/cardiac troponin negative, blood count, electrolytes, chest x-ray normal.  PT referrall declined  Health maintenance.   Doing well, vaccines updated.  Discussed health  maintenance, screening test, lifestyle modification.  Acute bacterial prostatitis.  Clinically improved/recheck UA/culture.  Actinic keratosis.  Bilateral face, improved status post freezing today.  Consider repeat freezing, resection if persistent symptoms.  Hypertension.  Improved today.  Overall good control.  Recently started HCTZ follow-up recheck kidney function/electrolytes.  History of mechanical mitral valve replacement, A. fib, pacemaker in place.  Followed by cardiology Dr. Graff.  Tolerating Coumadin, signs and symptoms of bleeding discussed, dose adjusted today.  Hyperlipidemia good control on statin.  Discussed diet, exercise and lifestyle modification.  Recheck fasting labs.  Subconjunctival hemorrhage/eyelid bruising.  Spontaneous onset, no straining or cough, no trauma.  Benefit Coumadin outweighs risk currently but will attempt to keep INR below 3, Rx adjusted.  Follow-up monitor INR levels closely.  Signs and symptoms of bleeding discussed, call return if worsening symptoms.        Diagnoses and all orders for this visit:    1. Shortness of breath (Primary)  -     POC INR  -     XR Chest PA & Lateral  -     CBC & Differential  -     Comprehensive Metabolic Panel  -     TSH  -     proBNP    2. Essential hypertension  -     POC INR  -     CBC & Differential  -     Comprehensive Metabolic Panel  -     TSH    3. Mixed hyperlipidemia  -     POC INR    4. Permanent atrial fibrillation (CMS/HCC)  -     POC INR    5. Presence of cardiac pacemaker  -     POC INR    6. Cough  -     XR Chest PA & Lateral  -     CBC & Differential  -     Comprehensive Metabolic Panel  -     TSH  -     proBNP              Expected course, medications, and adverse effects discussed.  Call or return if worsening or persistent symptoms.  I wore protective equipment throughout this patient encounter including a mask, gloves, and eye protection.  Hand hygiene was performed before donning protective equipment and after removal when  leaving the room. The complete contents of the Assessment and Plan and Data/Lab Results as documented above have been reviewed and addressed by myself with the patient today as part of an ongoing evaluation / treatment plan.  If some of the documentation has been copied from a previous note and is unchanged it indicates that this problem / plan has been assessed today but is stable from a previous visit and no changes have been recommended.

## 2021-08-17 NOTE — TELEPHONE ENCOUNTER
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 Alaniz as planned, could consider further evaluation of his lungs if his heart evaluation is unrevealing, thanks

## 2021-08-18 NOTE — TELEPHONE ENCOUNTER
Also want him to stop taking hydrochlorothiazide for now since we are putting him on a stronger water pill, want him to keep his follow-up visit with me on September 27 and will recheck his kidney function at that time, thanks

## 2021-08-18 NOTE — TELEPHONE ENCOUNTER
----- Message from Zeke Cage MD sent at 8/18/2021  4:43 PM EDT -----  Let him know his blood work looks good, his blood count is normal, no sign of anemia, his blood test for heart failure is elevated, this is contributing to his shortness of breath, want to start him on a water pill to help clear some of the fluid from behind his heart, will have him take this 3 days a week in the morning, do not want him to overdo it on this 1 to get to the hydrated, send furosemide 20 mg in the a.m. 3 days a week #30 with 2 refills, also send potassium 20 mEq take on days furosemide is taken, thanks

## 2021-08-18 NOTE — TELEPHONE ENCOUNTER
Jean Pierre expressed understanding of taking both medications together and for only 3 days a week. Also was told to discontinue hydrochlorothiazide until further notice so not to dehydrate him. He expressed understanding and also reports he has appointment with Dr Graff- cardiologist on August 26

## 2021-08-26 NOTE — PROGRESS NOTES
"    Subjective:     Encounter Date:08/26/2021      Patient ID: Jean Pierre Lazo is a 89 y.o. male.    Chief Complaint:  History of Present Illness 89-year-old white male with history of coronary disease history of mitral disorder status post mitral valve replacement history of atrial fibrillation status post pacemaker placement hypertension hyperlipidemia presents to my office for follow-up.  Patient is currently having symptoms of shortness of breath.  No complaint of any chest pain.  No PND orthopnea.  No palpitation dizziness syncope he has some swelling of the feet but is taking medicine regularly.  He was seen by his primary care doctor and placed on Lasix but he still continues to have shortness of breath but his swelling is better now    The following portions of the patient's history were reviewed and updated as appropriate: allergies, current medications, past family history, past medical history, past social history, past surgical history and problem list.  Past Medical History:   Diagnosis Date   • Atrial fibrillation (CMS/HCC)    • Coronary artery disease    • History of heart valve replacement    • Hyperlipidemia    • Hypertension      Past Surgical History:   Procedure Laterality Date   • CARDIAC CATHETERIZATION     • CARDIAC VALVE SURGERY     • CHOLECYSTECTOMY     • PACEMAKER IMPLANTATION       /94 (BP Location: Left arm, Patient Position: Sitting, Cuff Size: Adult)   Pulse 59   Ht 177.8 cm (70\")   Wt 81.6 kg (180 lb)   SpO2 98%   BMI 25.83 kg/m²   Family History   Problem Relation Age of Onset   • No Known Problems Mother    • No Known Problems Father    • No Known Problems Sister    • No Known Problems Brother        Current Outpatient Medications:   •  furosemide (LASIX) 20 MG tablet, Take one tablet by mouth in the morning 3 days a week, Disp: 30 tablet, Rfl: 2  •  lovastatin (MEVACOR) 20 MG tablet, Take 2 tablets by mouth Daily., Disp: 180 tablet, Rfl: 1  •  metoprolol tartrate " (LOPRESSOR) 25 MG tablet, Take 1 tablet by mouth 2 (Two) Times a Day., Disp: 180 tablet, Rfl: 2  •  Multiple Vitamins-Minerals (MULTI VITAMIN/MINERALS) tablet, MULTI VITAMIN/MINERALS TABS, Disp: , Rfl:   •  niacin 500 MG tablet, Take 500 mg by mouth Every Night., Disp: , Rfl:   •  potassium chloride (K-DUR,KLOR-CON) 20 MEQ CR tablet, Take 1 tablet by mouth 3 days a week with furosemide, Disp: 30 tablet, Rfl: 3  •  warfarin (COUMADIN) 3 MG tablet, Take 1 tablet by mouth Daily., Disp: 30 tablet, Rfl: 1  •  losartan (COZAAR) 100 MG tablet, Take 1 tablet by mouth Daily for 30 days., Disp: 90 tablet, Rfl: 3  No Known Allergies  Social History     Socioeconomic History   • Marital status:      Spouse name: Not on file   • Number of children: Not on file   • Years of education: Not on file   • Highest education level: Not on file   Tobacco Use   • Smoking status: Former Smoker     Types: Cigarettes     Quit date:      Years since quittin.6   • Smokeless tobacco: Never Used   Vaping Use   • Vaping Use: Never used   Substance and Sexual Activity   • Alcohol use: No   • Drug use: No   • Sexual activity: Defer     Review of Systems   Constitutional: Positive for decreased appetite and malaise/fatigue. Negative for fever.   Cardiovascular: Positive for leg swelling. Negative for chest pain, dyspnea on exertion and palpitations.   Respiratory: Positive for shortness of breath. Negative for cough.    Skin: Negative for rash.   Gastrointestinal: Negative for abdominal pain, nausea and vomiting.   Neurological: Negative for focal weakness, headaches, light-headedness and weakness.   All other systems reviewed and are negative.             Objective:     Constitutional:       Appearance: Well-developed.   Eyes:      General: No scleral icterus.     Conjunctiva/sclera: Conjunctivae normal.   HENT:      Head: Normocephalic and atraumatic.   Neck:      Vascular: No carotid bruit or JVD.   Pulmonary:      Effort:  Pulmonary effort is normal.      Breath sounds: Normal breath sounds. No wheezing. No rales.   Cardiovascular:      Normal rate. Regular rhythm.   Pulses:     Intact distal pulses.   Abdominal:      General: Bowel sounds are normal.      Palpations: Abdomen is soft.   Musculoskeletal:      Cervical back: Normal range of motion and neck supple. Skin:     General: Skin is warm and dry.      Findings: No rash.   Neurological:      Mental Status: Alert.         ECG 12 Lead    Date/Time: 8/26/2021 2:49 PM  Performed by: Roger Graff MD  Authorized by: Roger Graff MD   Comments: Ventricular pacemaker rhythm.  Abnormal EKG  No new changes from previous EKG            Lab Review:         MDM  1.  Coronary disease  Patient had nonobstructive disease in the past with normal systolic function is currently stable on medications  2.  Atrial fibrillation  Patient is currently on warfarin and stable along with beta-blockers  3.  Shortness of breath  Patient is having increasing shortness of breath and hence I will perform an echocardiogram  4.  Hypertension  Patient blood pressure currently stable on medications #5 hyperlipidemia  Patient's lipid levels are followed by primary doctor and is on lovastatin  6.  Pacemaker placement  Patient's pacemaker is working very well.    Patient's previous medical records, labs, and EKG were reviewed and discussed with the patient at today's visit.

## 2021-08-30 NOTE — PROGRESS NOTES
Coumadin Management:  Jean Pierre Lazo is a 89 y.o. male who presents for coumadin management. He is on anticoagulation due to atrial fibrillation.     The goal INR for this patient is 2.0-3.0. The patient has their INR checked 2 times per month    Symptoms included easy bruising.    Patient Findings     Positives:  Missed doses, Bruising    Negatives:  Signs/symptoms of thrombosis, Signs/symptoms of bleeding,   Laboratory test error suspected, Change in health, Change in alcohol use,   Change in activity, Upcoming invasive procedure, Emergency department   visit, Upcoming dental procedure, Extra doses, Change in medications,   Change in diet/appetite, Hospital admission, Other complaints    Comments:  1 dose on hold 8/17 due to seeing cardiologist      Clinical Outcomes     Negatives:  Major bleeding event, Thromboembolic event,   Anticoagulation-related hospital admission, Anticoagulation-related ED   visit, Anticoagulation-related fatality    Comments:  1 dose on hold 8/17 due to seeing cardiologist         Last INR:  Date: 8/16/2021  INR: 4.1  Warfarin change at last visit increased to take 2.5mg 4 days a week and 3mg 3 days a week      Today's INR:  INR   Date Value Ref Range Status   08/30/2021 3.70 (A) 2 - 3 Final       Present warfarin dose is warfarin 2.5mg M W F and Sat. 3 mg all other. Dosing change include take 3mg Mond and Friday. 2.5 all other . Repeat INR in 2 weeks.    By report, Jean Pierre is compliant most of the time.

## 2021-09-13 NOTE — PROGRESS NOTES
Coumadin Management:  Jean Pierre Lazo is a 89 y.o. male who presents for coumadin management. He is on anticoagulation due to atrial fibrillation.     The goal INR for this patient is 2.0-3.0. The patient has their INR checked 1 time per month    Symptoms included easy bruising.    Patient Findings     Positives:  Bruising    Negatives:  Signs/symptoms of thrombosis, Signs/symptoms of bleeding,   Laboratory test error suspected, Change in health, Change in alcohol use,   Change in activity, Upcoming invasive procedure, Emergency department   visit, Upcoming dental procedure, Missed doses, Extra doses, Change in   medications, Change in diet/appetite, Hospital admission, Other complaints        Clinical Outcomes     Negatives:  Major bleeding event, Thromboembolic event,   Anticoagulation-related hospital admission, Anticoagulation-related ED   visit, Anticoagulation-related fatality         Last INR:  Date: 8/30/2021  INR: 3.7  Warfarin change at last visit decreased to warfarin 2.5 mg PO daily, except warfarin 3 mg PO on monday and friday.     Today's INR:  INR   Date Value Ref Range Status   09/13/2021 2.50 2 - 3 Final       Present warfarin dose is warfarin 2.5 mg PO daily, except warfarin 3 mg PO on mondays and fridays. . Dosing change include unchanged. Repeat INR in 2 weeks.    By report, Jean Pierre is compliant most of the time.

## 2021-09-24 NOTE — PROGRESS NOTES
Subjective   Jean Pierre Lazo is a 89 y.o. male.     Chief Complaint   Patient presents with   • Medicare Wellness-subsequent   • Hypertension   • Hyperlipidemia   • Atrial Fibrillation       The patient is here: for coordination of medical care to discuss health maintenance and disease prevention to follow up on multiple medical problems.  Last comprehensive physical was on 9/23/2020.  Previous physical was performed by  Zeke Cage MD  Overall has: minimal activity with work/home activities, poor appetite, feels well with minor complaints and is sleeping well. Nutrition: eating a variety of foods. Last tetanus shot was 7/15/2018.   Patient's last stress test was: 8/2/2016. Patient's last colonoscopy was done 1/6/2010 by Dr Cage      Jean Pierre is on Coumadin 2.5 mgs Monday/Wednesday/Friday and 3mg all other days of the week todays INR was 4.1 last INR was 7/16 and was 2.5    Hypertension  This is a chronic problem. The current episode started more than 1 year ago. The problem is unchanged. The problem is controlled. Pertinent negatives include no blurred vision, chest pain, palpitations or shortness of breath. Risk factors for coronary artery disease include male gender and dyslipidemia. Current antihypertension treatment includes angiotensin blockers, diuretics and beta blockers. The current treatment provides moderate improvement. There are no compliance problems.  Hypertensive end-organ damage includes CAD/MI and CVA.   Hyperlipidemia  This is a chronic problem. The current episode started more than 1 year ago. He has no history of diabetes or obesity. Factors aggravating his hyperlipidemia include beta blockers. Associated symptoms include myalgias. Pertinent negatives include no chest pain or shortness of breath. Current antihyperlipidemic treatment includes statins. The current treatment provides moderate improvement of lipids. There are no compliance problems.  Risk factors for coronary artery disease  include hypertension, dyslipidemia and male sex.   Atrial Fibrillation  Presents for follow-up visit. Symptoms include hypertension and weakness. Symptoms are negative for chest pain, palpitations and shortness of breath. The symptoms have been stable (permanent). Past medical history includes atrial fibrillation and hyperlipidemia.        Recent Hospitalizations:  No hospitalization(s) within the last year..  ccc    I personally reviewed and updated the patient's allergies, medications, problem list, and past medical, surgical, social, and family history. I have reviewed and confirmed the accuracy of the HPI and ROS as documented by the MA/LPN/RN Zeke Cage MD      Family History   Problem Relation Age of Onset   • No Known Problems Mother    • No Known Problems Father    • No Known Problems Sister    • No Known Problems Brother        Social History     Tobacco Use   • Smoking status: Former Smoker     Types: Cigarettes     Quit date:      Years since quittin.7   • Smokeless tobacco: Never Used   Vaping Use   • Vaping Use: Never used   Substance Use Topics   • Alcohol use: No   • Drug use: No       Past Surgical History:   Procedure Laterality Date   • CARDIAC CATHETERIZATION     • CARDIAC VALVE SURGERY     • CHOLECYSTECTOMY     • PACEMAKER IMPLANTATION         Patient Active Problem List   Diagnosis   • Permanent atrial fibrillation (CMS/HCC)   • Atrioventricular block   • Cerebrovascular accident (CVA) (CMS/HCC)   • Cholelithiasis with cholecystitis   • Coronary artery disease   • History of heart valve replacement   • Mixed hyperlipidemia   • Essential hypertension   • Mitral valve disease   • Presence of aortocoronary bypass graft   • Presence of cardiac pacemaker   • Medicare annual wellness visit, subsequent   • Bilateral impacted cerumen   • Actinic keratosis   • Eye redness   • Subconjunctival hemorrhage of right eye   • Malignant neoplasm of colon (CMS/HCC)   • Hospital discharge follow-up    • Shortness of breath   • History of tobacco use         Current Outpatient Medications:   •  furosemide (LASIX) 20 MG tablet, Take one tablet by mouth in the morning 3 days a week, Disp: 30 tablet, Rfl: 2  •  lovastatin (MEVACOR) 20 MG tablet, Take 2 tablets by mouth Daily., Disp: 180 tablet, Rfl: 1  •  metoprolol tartrate (LOPRESSOR) 25 MG tablet, Take 1 tablet by mouth 2 (Two) Times a Day., Disp: 180 tablet, Rfl: 2  •  Multiple Vitamins-Minerals (MULTI VITAMIN/MINERALS) tablet, MULTI VITAMIN/MINERALS TABS, Disp: , Rfl:   •  niacin 500 MG tablet, Take 500 mg by mouth Every Night., Disp: , Rfl:   •  omeprazole (priLOSEC) 40 MG capsule, Take 40 mg by mouth Daily., Disp: , Rfl:   •  potassium chloride (K-DUR,KLOR-CON) 20 MEQ CR tablet, Take 1 tablet by mouth 3 days a week with furosemide, Disp: 30 tablet, Rfl: 3  •  warfarin (COUMADIN) 3 MG tablet, Take 1 tablet by mouth once daily, Disp: 45 tablet, Rfl: 0  •  losartan (COZAAR) 50 MG tablet, Take 2 tablets by mouth Daily for 30 days., Disp: 90 tablet, Rfl: 3         Review of Systems   Constitutional: Negative for chills and diaphoresis.   HENT: Negative for trouble swallowing and voice change.    Eyes: Negative for blurred vision and visual disturbance.   Respiratory: Negative for shortness of breath.    Cardiovascular: Negative for chest pain and palpitations.   Gastrointestinal: Negative for abdominal pain and nausea.   Endocrine: Negative for polydipsia and polyphagia.   Genitourinary: Negative for hematuria.   Musculoskeletal: Positive for myalgias. Negative for neck stiffness.   Skin: Negative for color change and pallor.   Allergic/Immunologic: Negative for immunocompromised state.   Neurological: Positive for weakness. Negative for seizures and syncope.   Hematological: Negative for adenopathy.   Psychiatric/Behavioral: Negative for hallucinations, sleep disturbance and suicidal ideas.       I have reviewed and confirmed the accuracy of the ROS as  "documented by the MA/LPN/RN Zeke Cage MD      Objective   /84   Pulse 63   Temp 97.7 °F (36.5 °C)   Resp 19   Ht 177.8 cm (70\")   Wt 78.1 kg (172 lb 3.2 oz)   SpO2 98%   BMI 24.71 kg/m²   BP Readings from Last 3 Encounters:   09/27/21 140/84   09/13/21 140/80   08/30/21 158/78     Wt Readings from Last 3 Encounters:   09/27/21 78.1 kg (172 lb 3.2 oz)   09/13/21 79.8 kg (176 lb)   08/30/21 81.6 kg (180 lb)     Physical Exam  Constitutional:       Appearance: He is well-developed. He is not diaphoretic.   HENT:      Head: Normocephalic.      Right Ear: Tympanic membrane, ear canal and external ear normal.      Left Ear: Tympanic membrane, ear canal and external ear normal.      Nose: Nose normal.   Eyes:      General: Lids are normal.      Conjunctiva/sclera: Conjunctivae normal.      Pupils: Pupils are equal, round, and reactive to light.   Neck:      Thyroid: No thyromegaly.      Vascular: No carotid bruit or JVD.      Trachea: No tracheal deviation.   Cardiovascular:      Rate and Rhythm: Normal rate and regular rhythm.      Heart sounds: Normal heart sounds. No murmur heard.   No friction rub. No gallop.    Pulmonary:      Effort: Pulmonary effort is normal.      Breath sounds: Normal breath sounds. No stridor. No decreased breath sounds, wheezing or rales.   Abdominal:      General: Bowel sounds are normal. There is no distension.      Palpations: Abdomen is soft. There is no mass.      Tenderness: There is no abdominal tenderness. There is no guarding or rebound.      Hernia: No hernia is present.   Lymphadenopathy:      Head:      Right side of head: No submental, submandibular, tonsillar, preauricular, posterior auricular or occipital adenopathy.      Left side of head: No submental, submandibular, tonsillar, preauricular, posterior auricular or occipital adenopathy.      Cervical: No cervical adenopathy.   Skin:     General: Skin is warm and dry.      Coloration: Skin is not pale.      " Comments: Actinic keratosis left face   Neurological:      Mental Status: He is alert and oriented to person, place, and time.      Cranial Nerves: No cranial nerve deficit.      Sensory: No sensory deficit.      Coordination: Coordination normal.      Gait: Gait normal.      Deep Tendon Reflexes: Reflexes are normal and symmetric.         Data / Lab Results:    Hemoglobin A1C   Date Value Ref Range Status   06/06/2016 4.8 4.6 - 7.1 % Final     Lab Results   Component Value Date     (H) 08/16/2021     Lab Results   Component Value Date    LDL 69 10/09/2020    LDL 71 08/02/2019    LDL 71 07/20/2018     Lab Results   Component Value Date    CHOL 136 07/20/2018    CHOL 166 07/17/2017    CHOL 157 05/03/2016     Lab Results   Component Value Date    TRIG 125 10/09/2020    TRIG 101 08/02/2019    TRIG 94 07/20/2018     Lab Results   Component Value Date    HDL 48 10/09/2020    HDL 53 08/02/2019    HDL 46 07/20/2018     No results found for: PSA  Lab Results   Component Value Date    WBC 8.6 08/16/2021    HGB 15.0 08/16/2021    HCT 45.1 08/16/2021    MCV 90 08/16/2021     08/16/2021     Lab Results   Component Value Date    TSH 2.350 08/16/2021      Lab Results   Component Value Date    GLUCOSE 101 (H) 03/29/2019    BUN 21 08/16/2021    CREATININE 1.51 (H) 08/16/2021    EGFRIFNONA 40 (L) 08/16/2021    EGFRIFAFRI 46 (L) 08/16/2021    BCR 14 08/16/2021    K 4.1 08/16/2021    CO2 23 08/16/2021    CALCIUM 9.5 08/16/2021    PROTENTOTREF 6.3 08/16/2021    ALBUMIN 4.1 08/16/2021    LABIL2 1.9 08/16/2021    AST 29 08/16/2021    ALT 9 08/16/2021     No results found for: DAVID, RF, SEDRATE   No results found for: CRP   No results found for: IRON, TIBC, FERRITIN   Lab Results   Component Value Date    YMUVQEYD12 485 08/02/2016          Age-appropriate Screening Schedule:  Refer to the list below for future screening recommendations based on patient's age, sex and/or medical conditions. Orders for these recommended tests  are listed in the plan section. The patient has been provided with a written plan.    Health Maintenance   Topic Date Due   • ZOSTER VACCINE (1 of 2) Never done   • INFLUENZA VACCINE  10/01/2021   • LIPID PANEL  10/09/2021   • TDAP/TD VACCINES (2 - Td or Tdap) 07/15/2028       Depression Screen:   PHQ-2/PHQ-9 Depression Screening 9/27/2021   Little interest or pleasure in doing things 0   Feeling down, depressed, or hopeless 0   Trouble falling or staying asleep, or sleeping too much 0   Feeling tired or having little energy 0   Poor appetite or overeating 0   Feeling bad about yourself - or that you are a failure or have let yourself or your family down 0   Trouble concentrating on things, such as reading the newspaper or watching television 0   Moving or speaking so slowly that other people could have noticed. Or the opposite - being so fidgety or restless that you have been moving around a lot more than usual 0   Thoughts that you would be better off dead, or of hurting yourself in some way 0   Total Score 0   If you checked off any problems, how difficult have these problems made it for you to do your work, take care of things at home, or get along with other people? -     I spent more than 16 minutes asking patient questions, counseling and documenting in the chart.    Health Habits and Functional and Cognitive Screening:  Functional & Cognitive Status 9/27/2021   Do you have difficulty preparing food and eating? No   Do you have difficulty bathing yourself, getting dressed or grooming yourself? No   Do you have difficulty using the toilet? No   Do you have difficulty moving around from place to place? No   Do you have trouble with steps or getting out of a bed or a chair? No   Current Diet Well Balanced Diet   Dental Exam Up to date   Eye Exam Up to date   Exercise (times per week) 0 times per week   Current Exercise Activities Include -   Do you need help using the phone?  No   Are you deaf or do you have  serious difficulty hearing?  No   Do you need help with transportation? Yes   Do you need help shopping? No   Do you need help preparing meals?  No   Do you need help with housework?  No   Do you need help with laundry? No   Do you need help taking your medications? No   Do you need help managing money? No   Do you ever drive or ride in a car without wearing a seat belt? No   Have you felt unusual stress, anger or loneliness in the last month? No   Who do you live with? Spouse   If you need help, do you have trouble finding someone available to you? No   Have you been bothered in the last four weeks by sexual problems? No   Do you have difficulty concentrating, remembering or making decisions? No       Does the patient have evidence of cognitive impairment? No    Advance Care Planning:  ACP discussion was held with the patient during this visit. Patient has an advance directive in EMR which is still valid.      A face-to-face visit was completed today with patient.  Counseling explanation, and discussion of advanced directives was performed.   The last advanced care visit was performed in 2020.  In a near life ending situation, from which he is not expected to recover functionally, and he is not able to speak for him, he does not want life sustaining measures. We discussed feeding tubes, ventilators and cardiac support as well as dialysis.    I spent more than 16 minutes discussing Advanced Care Planning information and documenting in the chart.    Identification of Risk Factors:  Risk factors include: Cardiovascular risk  Colon Cancer Screening  Fall Risk  Immunizations Discussed/Encouraged (specific immunizations; Shingrix )  Lung Cancer Risk  Prostate Cancer Screening .    Compared to one year ago, the patient feels his physical health is the same.  Compared to one year ago, the patient feels his mental health is the same.    Patient Self-Management and Personalized Health Advice  The patient has been provided  with information about: diet, exercise, weight management, fall prevention, supplements and mental health concerns and preventive services including:   · Annual Wellness Visit (AWV)  · Cardiovascular Disease Screening Tests (may do this order every 5 years in beneficiaries without signs or symptoms of cardiovascular disease)  · Colorectal Cancer Screening, Colonoscopy  · Counseling to Prevent Tobacco Use (use of smartset and @cessation@ smartphrase for documentation)  · Depression Screening (15 minutes face to face, Code )  · Initial Preventative Physical Examination (IPPE)  · Pneumococcal Vaccine and Administration.      Assessment/Plan      Medications        Problem List         LOS       Medicare wellness.   Doing well, vaccines updated.  Discussed health maintenance, screening test, lifestyle modification.  Acute bacterial prostatitis.  Clinically improved/recheck UA/culture.  Actinic keratosis.  Bilateral face, improved status post freezing /follow up  repeat freezing, consider resection if persistent symptoms.  Hypertension.  Improved today.  Overall good control.  Recently started HCTZ follow-up recheck kidney function/electrolytes.  History of mechanical mitral valve replacement, A. fib, pacemaker in place.  Followed by cardiology Dr. Graff.  Tolerating Coumadin, signs and symptoms of bleeding discussed, dose adjusted today.  Hyperlipidemia good control on statin.  Discussed diet, exercise and lifestyle modification.  Recheck fasting labs.  Subconjunctival hemorrhage/eyelid bruising.  Spontaneous onset, no straining or cough, no trauma.  Benefit Coumadin outweighs risk currently but will attempt to keep INR below 3, Rx adjusted.  Follow-up monitor INR levels closely.  Signs and symptoms of bleeding discussed, call return if worsening symptoms.  Fatigue/leg weakness.       Persistent symptoms today, has completed Rx for bronchitis/bacterial prostatitis.. Repeat chest x-ray benign, DDx includes CHF  exacerbation proBNP to 3000, he stopped losartan, restart at 50 mg daily.,  Has had cardiology eval Dr. Graff, echocardiogram upcoming..    Status post inpatient stay/cardiac troponin negative, blood count, electrolytes, chest x-ray normal.  PT referrall scheduled.  Follow-up recheck.  .    Diagnoses and all orders for this visit:    1. Medicare annual wellness visit, subsequent (Primary)  -     POC INR    2. Essential hypertension  -     POC INR    3. Mixed hyperlipidemia  -     POC INR  -     losartan (COZAAR) 50 MG tablet; Take 2 tablets by mouth Daily for 30 days.  Dispense: 90 tablet; Refill: 3    4. Permanent atrial fibrillation (CMS/HCC)  -     POC INR    5. Screening for malignant neoplasm of prostate  -     POC INR    6. Screening for malignant neoplasm of colon  -     POC INR    7. History of tobacco use  -     POC INR    8. Need for influenza vaccination  -     Fluzone High-Dose 65+yrs ([id number])    9. Weakness of both lower extremities  -     Ambulatory Referral to Physical Therapy    10. Unsteady gait  -     Ambulatory Referral to Physical Therapy              Expected course, medications, and adverse effects discussed.  Call or return if worsening or persistent symptoms.  I wore protective equipment throughout this patient encounter including a mask, gloves, and eye protection.  Hand hygiene was performed before donning protective equipment and after removal when leaving the room. The complete contents of the Assessment and Plan and Data / Lab Results as documented above have been reviewed and addressed by myself with the patient today as part of an ongoing evaluation / treatment plan.  If some of the documentation has been copied from a previous note and is unchanged it indicates that this problem / plan has been assessed today but is stable from a previous visit and no changes have been recommended.

## 2021-09-27 PROBLEM — Z87.891 HISTORY OF TOBACCO USE: Status: ACTIVE | Noted: 2021-01-01

## 2021-09-27 NOTE — TELEPHONE ENCOUNTER
He has an INR scheduled in a month, lets change that to an office visit so I can freeze a spot on his face, thanks

## 2021-10-14 NOTE — PROGRESS NOTES
"    Subjective:     Encounter Date:10/14/2021      Patient ID: Jean Pierre Lazo is a 89 y.o. male.    Chief Complaint:  History of Present Illness 89-year-old white male with history of coronary disease history of mitral valve disorder status post mitral valve replacement history of hypertension hyperlipidemia history of atrial fibrillation and pacemaker placement presents to my office for follow-up.  Patient is currently stable without any signs of chest pain but has some shortness of breath with exertion.  No complains any PND orthopnea.  No palpitation dizziness syncope he has some swelling of the feet but is taking his medicine regularly.  He does not smoke but is trying to exercise regularly follows a good diet.    The following portions of the patient's history were reviewed and updated as appropriate: allergies, current medications, past family history, past medical history, past social history, past surgical history and problem list.  Past Medical History:   Diagnosis Date   • Atrial fibrillation (HCC)    • Coronary artery disease    • History of heart valve replacement    • Hyperlipidemia    • Hypertension      Past Surgical History:   Procedure Laterality Date   • CARDIAC CATHETERIZATION     • CARDIAC VALVE SURGERY     • CHOLECYSTECTOMY     • PACEMAKER IMPLANTATION       /86   Pulse 60   Resp 17   Ht 177.8 cm (70\")   Wt 77.7 kg (171 lb 3.2 oz)   SpO2 99%   BMI 24.56 kg/m²   Family History   Problem Relation Age of Onset   • No Known Problems Mother    • No Known Problems Father    • No Known Problems Sister    • No Known Problems Brother        Current Outpatient Medications:   •  furosemide (LASIX) 20 MG tablet, Take one tablet by mouth in the morning 3 days a week, Disp: 30 tablet, Rfl: 2  •  losartan (COZAAR) 50 MG tablet, Take 2 tablets by mouth Daily for 30 days., Disp: 90 tablet, Rfl: 3  •  lovastatin (MEVACOR) 20 MG tablet, Take 2 tablets by mouth Daily., Disp: 180 tablet, Rfl: 1  •  " metoprolol tartrate (LOPRESSOR) 25 MG tablet, Take 1 tablet by mouth 2 (Two) Times a Day., Disp: 180 tablet, Rfl: 2  •  Multiple Vitamins-Minerals (MULTI VITAMIN/MINERALS) tablet, MULTI VITAMIN/MINERALS TABS, Disp: , Rfl:   •  niacin 500 MG tablet, Take 500 mg by mouth Every Night., Disp: , Rfl:   •  omeprazole (priLOSEC) 40 MG capsule, Take 40 mg by mouth Daily., Disp: , Rfl:   •  potassium chloride (K-DUR,KLOR-CON) 20 MEQ CR tablet, Take 1 tablet by mouth 3 days a week with furosemide, Disp: 30 tablet, Rfl: 3  •  warfarin (COUMADIN) 3 MG tablet, Take 1 tablet by mouth once daily, Disp: 45 tablet, Rfl: 0  No Known Allergies  Social History     Socioeconomic History   • Marital status:    Tobacco Use   • Smoking status: Former Smoker     Types: Cigarettes     Quit date:      Years since quittin.8   • Smokeless tobacco: Never Used   Vaping Use   • Vaping Use: Never used   Substance and Sexual Activity   • Alcohol use: No   • Drug use: No   • Sexual activity: Defer     Review of Systems   Constitutional: Negative for fever and malaise/fatigue.   Cardiovascular: Positive for leg swelling. Negative for chest pain, dyspnea on exertion and palpitations.   Respiratory: Positive for shortness of breath. Negative for cough.    Skin: Negative for rash.   Gastrointestinal: Negative for abdominal pain, nausea and vomiting.   Neurological: Positive for loss of balance and weakness. Negative for dizziness, focal weakness, headaches, light-headedness and numbness.   All other systems reviewed and are negative.             Objective:     Constitutional:       Appearance: Well-developed.   Eyes:      General: No scleral icterus.     Conjunctiva/sclera: Conjunctivae normal.   HENT:      Head: Normocephalic and atraumatic.   Neck:      Vascular: No carotid bruit or JVD.   Pulmonary:      Effort: Pulmonary effort is normal.      Breath sounds: Normal breath sounds. No wheezing. No rales.   Cardiovascular:      Normal  rate. Irregularly irregular rhythm.   Pulses:     Intact distal pulses.   Abdominal:      General: Bowel sounds are normal.      Palpations: Abdomen is soft.   Musculoskeletal:      Cervical back: Normal range of motion and neck supple. Skin:     General: Skin is warm and dry.      Findings: No rash.   Neurological:      Mental Status: Alert.       Procedures    Lab Review:         MDM  1.  Coronary disease  Patient had coronary bypass surgery x3 vessels with a LIMA to LAD and saphenous graft to the marginal branch and RCA and is currently stable with normal LV systolic function  2.  Mitral valve disorder  Patient status post mitral valve placement with a mechanical valve and is on warfarin keeps INR around 3-3.5  3.  Atrial fibrillation  Patient blood pressure and heart rate are stable with metoprolol and is on warfarin for anticoagulation  4.  History of tachybradycardia syndrome status post pacemaker placement  Patient's pacemaker is working very well  5.  Hypertension  Patient blood pressure currently stable on losartan and metoprolol  6.  Hyperlipidemia  Patient is currently on lovastatin.    Patient's previous medical records, labs, and EKG were reviewed and discussed with the patient at today's visit.

## 2021-10-25 NOTE — PROGRESS NOTES
Subjective   Jean Pierre Lazo is a 89 y.o. male.     Chief Complaint   Patient presents with   • Lesion   • Atrial Fibrillation       Lesion:   Jean Pierre Lazo is here today for a lesion. The lesion appeared gradual and has been occurring for 1 year.. The course has been stable. The lesion is characterized as no sign of infection. The lesion is located over face. The symptoms have been associated with tenderness.     Atrial Fibrillation  Presents for follow-up visit. Symptoms include hypertension, shortness of breath and weakness. Symptoms are negative for chest pain, dizziness, hypotension, palpitations and syncope. The symptoms have been stable (permanent). Past medical history includes atrial fibrillation.            I personally reviewed and updated the patient's allergies, medications, problem list, and past medical, surgical, social, and family history. I have reviewed and confirmed the accuracy of the History of Present Illness and Review of Symptoms as documented by the MA/LPN/RN. Zeke Cage MD    Family History   Problem Relation Age of Onset   • No Known Problems Mother    • No Known Problems Father    • No Known Problems Sister    • No Known Problems Brother        Social History     Tobacco Use   • Smoking status: Former Smoker     Types: Cigarettes     Quit date:      Years since quittin.8   • Smokeless tobacco: Never Used   Vaping Use   • Vaping Use: Never used   Substance Use Topics   • Alcohol use: No   • Drug use: No       Past Surgical History:   Procedure Laterality Date   • CARDIAC CATHETERIZATION     • CARDIAC VALVE SURGERY     • CHOLECYSTECTOMY     • PACEMAKER IMPLANTATION         Patient Active Problem List   Diagnosis   • Permanent atrial fibrillation (HCC)   • Atrioventricular block   • Cerebrovascular accident (CVA) (HCC)   • Cholelithiasis with cholecystitis   • Coronary artery disease   • History of heart valve replacement   • Mixed hyperlipidemia   • Essential hypertension   •  Mitral valve disease   • Presence of aortocoronary bypass graft   • Presence of cardiac pacemaker   • Medicare annual wellness visit, subsequent   • Bilateral impacted cerumen   • Actinic keratosis   • Eye redness   • Subconjunctival hemorrhage of right eye   • Malignant neoplasm of colon (HCC)   • Hospital discharge follow-up   • Shortness of breath   • History of tobacco use   • Skin lesion   • Malaise and fatigue         Current Outpatient Medications:   •  furosemide (LASIX) 20 MG tablet, Take one tablet by mouth in the morning 3 days a week, Disp: 30 tablet, Rfl: 2  •  losartan (COZAAR) 50 MG tablet, Take 2 tablets by mouth Daily for 30 days., Disp: 90 tablet, Rfl: 3  •  metoprolol tartrate (LOPRESSOR) 25 MG tablet, Take 0.5 tablets by mouth 2 (Two) Times a Day., Disp: 90 tablet, Rfl: 2  •  Multiple Vitamins-Minerals (MULTI VITAMIN/MINERALS) tablet, MULTI VITAMIN/MINERALS TABS, Disp: , Rfl:   •  niacin 500 MG tablet, Take 500 mg by mouth Every Night., Disp: , Rfl:   •  omeprazole (priLOSEC) 40 MG capsule, Take 40 mg by mouth Daily., Disp: , Rfl:   •  potassium chloride (K-DUR,KLOR-CON) 20 MEQ CR tablet, Take 1 tablet by mouth 3 days a week with furosemide (Patient taking differently: Daily. Take 1 tablet by mouth 3 days a week with furosemide), Disp: 30 tablet, Rfl: 3  •  warfarin (COUMADIN) 3 MG tablet, Take 1 tablet by mouth once daily, Disp: 45 tablet, Rfl: 0  •  lovastatin (MEVACOR) 20 MG tablet, Take 2 tablets by mouth once daily, Disp: 180 tablet, Rfl: 0         Review of Systems   Constitutional: Negative for chills and diaphoresis.   Eyes: Negative for visual disturbance.   Respiratory: Positive for shortness of breath.    Cardiovascular: Negative for chest pain, palpitations and syncope.   Gastrointestinal: Negative for abdominal pain and nausea.   Endocrine: Negative for polydipsia and polyphagia.   Musculoskeletal: Negative for neck stiffness.   Skin: Negative for color change and pallor.  "  Neurological: Positive for weakness. Negative for dizziness, seizures and syncope.   Hematological: Negative for adenopathy.       I have reviewed and confirmed the accuracy of the ROS as documented by the MA/LPN/RN Zeke Cage MD      Objective   /88   Pulse 65   Temp 97.3 °F (36.3 °C)   Resp 18   Ht 177.8 cm (70\")   Wt 76.9 kg (169 lb 9.6 oz)   SpO2 99%   BMI 24.34 kg/m²   BP Readings from Last 3 Encounters:   10/26/21 128/88   10/14/21 131/69   10/14/21 161/86     Wt Readings from Last 3 Encounters:   10/26/21 76.9 kg (169 lb 9.6 oz)   10/14/21 78 kg (172 lb)   10/14/21 77.7 kg (171 lb 3.2 oz)     Physical Exam  Constitutional:       Appearance: Normal appearance. He is well-developed. He is not diaphoretic.   Cardiovascular:      Rate and Rhythm: Normal rate and regular rhythm.      Pulses: Normal pulses.      Heart sounds: Normal heart sounds, S1 normal and S2 normal. No murmur heard.  No friction rub. No gallop.    Pulmonary:      Effort: Pulmonary effort is normal. No accessory muscle usage.      Breath sounds: Normal breath sounds. No stridor. No decreased breath sounds, wheezing, rhonchi or rales.   Abdominal:      General: Bowel sounds are normal. There is no distension.      Palpations: Abdomen is soft. Abdomen is not rigid. There is no mass or pulsatile mass.      Tenderness: There is no abdominal tenderness. There is no guarding or rebound. Negative signs include Daigle's sign.      Hernia: No hernia is present.   Skin:     General: Skin is warm and dry.      Coloration: Skin is not pale.      Comments: Actinic keratosis lower face by 2, improved   Neurological:      Mental Status: He is alert and oriented to person, place, and time.      Coordination: Coordination normal.      Gait: Gait normal.         Data / Lab Results:    Hemoglobin A1C   Date Value Ref Range Status   06/06/2016 4.8 4.6 - 7.1 % Final     Lab Results   Component Value Date     (H) 08/16/2021     Lab Results "   Component Value Date    LDL 69 10/09/2020    LDL 71 08/02/2019    LDL 71 07/20/2018     Lab Results   Component Value Date    CHOL 136 07/20/2018    CHOL 166 07/17/2017    CHOL 157 05/03/2016     Lab Results   Component Value Date    TRIG 125 10/09/2020    TRIG 101 08/02/2019    TRIG 94 07/20/2018     Lab Results   Component Value Date    HDL 48 10/09/2020    HDL 53 08/02/2019    HDL 46 07/20/2018     No results found for: PSA  Lab Results   Component Value Date    WBC 8.6 08/16/2021    HGB 15.0 08/16/2021    HCT 45.1 08/16/2021    MCV 90 08/16/2021     08/16/2021     Lab Results   Component Value Date    TSH 2.350 08/16/2021      Lab Results   Component Value Date    GLUCOSE 101 (H) 03/29/2019    BUN 21 08/16/2021    CREATININE 1.51 (H) 08/16/2021    EGFRIFNONA 40 (L) 08/16/2021    EGFRIFAFRI 46 (L) 08/16/2021    BCR 14 08/16/2021    K 4.1 08/16/2021    CO2 23 08/16/2021    CALCIUM 9.5 08/16/2021    PROTENTOTREF 6.3 08/16/2021    ALBUMIN 4.1 08/16/2021    LABIL2 1.9 08/16/2021    AST 29 08/16/2021    ALT 9 08/16/2021     No results found for: DAVID, RF, SEDRATE   No results found for: CRP   No results found for: IRON, TIBC, FERRITIN   Lab Results   Component Value Date    KECICKIK29 485 08/02/2016          Assessment/Plan      Medications        Problem List         LOS      Health maintenance.   Doing well, vaccines updated.  Discussed health maintenance, screening test, lifestyle modification.  Acute bacterial prostatitis.  Clinically improved/recheck UA/culture.  Actinic keratosis.  2 lesions left lower face, improved today, frozen again today, follow-up recheck, consider repeat freezing, consider resection if persistent symptoms.  Hypertension.  Improved today.  Overall good control.  Recently started HCTZ follow-up recheck kidney function/electrolytes.  History of mechanical mitral valve replacement, A. fib, pacemaker in place.  Followed by cardiology Dr. Graff.  Tolerating Coumadin, signs and symptoms  of bleeding discussed, dose adjusted today.  Hyperlipidemia good control on statin.  Discussed diet, exercise and lifestyle modification.  Recheck fasting labs.  Subconjunctival hemorrhage/eyelid bruising.  Spontaneous onset, no straining or cough, no trauma.  Benefit Coumadin outweighs risk currently but will attempt to keep INR below 3, Rx adjusted.  Follow-up monitor INR levels closely.  Signs and symptoms of bleeding discussed, call return if worsening symptoms.  Fatigue/leg weakness.       Persistent symptoms today, has completed Rx for bronchitis/bacterial prostatitis.. Repeat chest x-ray benign, DDx includes CHF exacerbation proBNP to 3000, he stopped losartan, restart at 50 mg daily.,  Has had cardiology eval Dr. Graff, Lasix increased to daily.  Follow-up recheck renal function/having some intermittent orthostasis decrease metoprolol to 12.5 mg twice daily.  Status post inpatient stay/cardiac troponin negative, blood count, electrolytes, chest x-ray normal.  PT referrall scheduled.  Follow-up recheck.        Diagnoses and all orders for this visit:    1. Permanent atrial fibrillation (HCC) (Primary)  -     POC INR    2. Skin lesion  -     POC INR    3. History of tobacco use  -     POC INR    4. Essential hypertension  -     metoprolol tartrate (LOPRESSOR) 25 MG tablet; Take 0.5 tablets by mouth 2 (Two) Times a Day.  Dispense: 90 tablet; Refill: 2    5. Coronary artery disease involving native coronary artery of native heart without angina pectoris    6. Malaise and fatigue              Expected course, medications, and adverse effects discussed.  Call or return if worsening or persistent symptoms.  I wore protective equipment throughout this patient encounter including a mask, gloves, and eye protection.  Hand hygiene was performed before donning protective equipment and after removal when leaving the room. The complete contents of the Assessment and Plan and Data/Lab Results as documented above have been  reviewed and addressed by myself with the patient today as part of an ongoing evaluation / treatment plan.  If some of the documentation has been copied from a previous note and is unchanged it indicates that this problem / plan has been assessed today but is stable from a previous visit and no changes have been recommended.

## 2021-10-26 PROBLEM — R53.81 MALAISE AND FATIGUE: Status: ACTIVE | Noted: 2021-01-01

## 2021-10-26 PROBLEM — L98.9 SKIN LESION: Status: ACTIVE | Noted: 2021-01-01

## 2021-10-26 PROBLEM — R53.83 MALAISE AND FATIGUE: Status: ACTIVE | Noted: 2021-01-01

## 2021-11-24 NOTE — PROGRESS NOTES
Subjective   Jean Pierre Lazo is a 89 y.o. male.     Chief Complaint   Patient presents with   • Atrial Fibrillation   • Hypertension   • Hyperlipidemia   • Skin Lesion       Lesion:   Jean Pierre Lazo is here today for a lesion. The lesion appeared gradual and has been occurring for 1 year.. The course has been stable. The lesion is characterized as no sign of infection. The lesion is located over face. The symptoms have been associated with tenderness.     Atrial Fibrillation  Presents for follow-up visit. Symptoms include hypertension, shortness of breath and weakness. Symptoms are negative for chest pain, dizziness, hypotension, palpitations and syncope. The symptoms have been stable (permanent). Past medical history includes atrial fibrillation and hyperlipidemia.   Hypertension  This is a chronic problem. The current episode started more than 1 year ago. The problem is unchanged. The problem is controlled. Associated symptoms include malaise/fatigue and shortness of breath. Pertinent negatives include no blurred vision, chest pain, headaches or palpitations. Risk factors for coronary artery disease include male gender and dyslipidemia. Current antihypertension treatment includes angiotensin blockers, diuretics and beta blockers. The current treatment provides moderate improvement. There are no compliance problems.  Hypertensive end-organ damage includes CAD/MI and CVA.   Hyperlipidemia  This is a chronic problem. The current episode started more than 1 year ago. He has no history of diabetes or obesity. Factors aggravating his hyperlipidemia include beta blockers. Associated symptoms include myalgias and shortness of breath. Pertinent negatives include no chest pain or leg pain. Current antihyperlipidemic treatment includes statins. The current treatment provides moderate improvement of lipids. There are no compliance problems.  Risk factors for coronary artery disease include hypertension, dyslipidemia and male  sex.            I personally reviewed and updated the patient's allergies, medications, problem list, and past medical, surgical, social, and family history. I have reviewed and confirmed the accuracy of the History of Present Illness and Review of Symptoms as documented by the MA/LPN/RN. Zeke Cage MD    Family History   Problem Relation Age of Onset   • No Known Problems Mother    • No Known Problems Father    • No Known Problems Sister    • No Known Problems Brother        Social History     Tobacco Use   • Smoking status: Former Smoker     Types: Cigarettes     Quit date:      Years since quittin.9   • Smokeless tobacco: Never Used   Vaping Use   • Vaping Use: Never used   Substance Use Topics   • Alcohol use: No   • Drug use: No       Past Surgical History:   Procedure Laterality Date   • CARDIAC CATHETERIZATION     • CARDIAC VALVE SURGERY     • CHOLECYSTECTOMY     • PACEMAKER IMPLANTATION         Patient Active Problem List   Diagnosis   • Permanent atrial fibrillation (HCC)   • Atrioventricular block   • Cerebrovascular accident (CVA) (HCC)   • Cholelithiasis with cholecystitis   • Coronary artery disease   • History of heart valve replacement   • Mixed hyperlipidemia   • Essential hypertension   • Mitral valve disease   • Presence of aortocoronary bypass graft   • Presence of cardiac pacemaker   • Medicare annual wellness visit, subsequent   • Bilateral impacted cerumen   • Actinic keratosis   • Eye redness   • Subconjunctival hemorrhage of right eye   • Malignant neoplasm of colon (HCC)   • Hospital discharge follow-up   • Shortness of breath   • History of tobacco use   • Skin lesion   • Malaise and fatigue   • Acute diastolic CHF (congestive heart failure) (HCC)         Current Outpatient Medications:   •  furosemide (LASIX) 20 MG tablet, Take one tablet by mouth in the morning 3 days a week, Disp: 30 tablet, Rfl: 2  •  lovastatin (MEVACOR) 20 MG tablet, Take 2 tablets by mouth once daily,  "Disp: 180 tablet, Rfl: 0  •  metoprolol tartrate (LOPRESSOR) 25 MG tablet, Take 0.5 tablets by mouth 2 (Two) Times a Day., Disp: 90 tablet, Rfl: 2  •  Multiple Vitamins-Minerals (MULTI VITAMIN/MINERALS) tablet, MULTI VITAMIN/MINERALS TABS, Disp: , Rfl:   •  niacin 500 MG tablet, Take 500 mg by mouth Every Night., Disp: , Rfl:   •  omeprazole (priLOSEC) 40 MG capsule, Take 40 mg by mouth Daily., Disp: , Rfl:   •  potassium chloride (K-DUR,KLOR-CON) 20 MEQ CR tablet, Take 1 tablet by mouth 3 days a week with furosemide (Patient taking differently: Daily. Take 1 tablet by mouth 3 days a week with furosemide), Disp: 30 tablet, Rfl: 3  •  warfarin (COUMADIN) 3 MG tablet, Take 1 tablet by mouth once daily, Disp: 45 tablet, Rfl: 0         Review of Systems   Constitutional: Positive for malaise/fatigue. Negative for chills and diaphoresis.   HENT: Negative for trouble swallowing and voice change.    Eyes: Negative for blurred vision and visual disturbance.   Respiratory: Positive for shortness of breath.    Cardiovascular: Negative for chest pain, palpitations and syncope.   Gastrointestinal: Negative for abdominal pain and nausea.   Endocrine: Negative for polydipsia and polyphagia.   Genitourinary: Negative for hematuria.   Musculoskeletal: Positive for myalgias. Negative for neck stiffness.   Skin: Negative for color change and pallor.   Allergic/Immunologic: Negative for immunocompromised state.   Neurological: Positive for weakness. Negative for dizziness, seizures and syncope.   Hematological: Negative for adenopathy.   Psychiatric/Behavioral: Negative for hallucinations, sleep disturbance and suicidal ideas.       I have reviewed and confirmed the accuracy of the ROS as documented by the MA/LPN/RN Zeke Cage MD      Objective   /80   Pulse 66   Temp 97.1 °F (36.2 °C)   Resp 18   Ht 177.8 cm (70\")   Wt 80.4 kg (177 lb 3.2 oz)   SpO2 99%   BMI 25.43 kg/m²   BP Readings from Last 3 Encounters: "   11/29/21 132/80   10/26/21 128/88   10/14/21 131/69     Wt Readings from Last 3 Encounters:   11/29/21 80.4 kg (177 lb 3.2 oz)   10/26/21 76.9 kg (169 lb 9.6 oz)   10/14/21 78 kg (172 lb)     Physical Exam  Constitutional:       Appearance: He is well-developed. He is not diaphoretic.   HENT:      Head: Normocephalic.      Right Ear: Tympanic membrane, ear canal and external ear normal.      Left Ear: Tympanic membrane, ear canal and external ear normal.      Nose: Nose normal.   Eyes:      General: Lids are normal.      Conjunctiva/sclera: Conjunctivae normal.      Pupils: Pupils are equal, round, and reactive to light.   Neck:      Thyroid: No thyromegaly.      Vascular: No carotid bruit or JVD.      Trachea: No tracheal deviation.   Cardiovascular:      Rate and Rhythm: Normal rate and regular rhythm.      Heart sounds: Normal heart sounds. No murmur heard.  No friction rub. No gallop.    Pulmonary:      Effort: Pulmonary effort is normal.      Breath sounds: Normal breath sounds. No stridor. No decreased breath sounds, wheezing or rales.   Abdominal:      General: Bowel sounds are normal. There is no distension.      Palpations: Abdomen is soft. There is no mass.      Tenderness: There is no abdominal tenderness. There is no guarding or rebound.      Hernia: No hernia is present.   Lymphadenopathy:      Head:      Right side of head: No submental, submandibular, tonsillar, preauricular, posterior auricular or occipital adenopathy.      Left side of head: No submental, submandibular, tonsillar, preauricular, posterior auricular or occipital adenopathy.      Cervical: No cervical adenopathy.   Skin:     General: Skin is warm and dry.      Coloration: Skin is not pale.   Neurological:      Mental Status: He is alert and oriented to person, place, and time.      Cranial Nerves: No cranial nerve deficit.      Sensory: No sensory deficit.      Coordination: Coordination normal.      Gait: Gait normal.      Deep  Tendon Reflexes: Reflexes are normal and symmetric.         Data / Lab Results:    Hemoglobin A1C   Date Value Ref Range Status   06/06/2016 4.8 4.6 - 7.1 % Final        Lab Results   Component Value Date    LDL 74 11/15/2021    LDL 69 10/09/2020    LDL 71 08/02/2019     Lab Results   Component Value Date    CHOL 136 07/20/2018    CHOL 166 07/17/2017    CHOL 157 05/03/2016     Lab Results   Component Value Date    TRIG 103 11/15/2021    TRIG 125 10/09/2020    TRIG 101 08/02/2019     Lab Results   Component Value Date    HDL 56 11/15/2021    HDL 48 10/09/2020    HDL 53 08/02/2019     No results found for: PSA  Lab Results   Component Value Date    WBC 5.9 11/15/2021    HGB 14.2 11/15/2021    HCT 41.9 11/15/2021    MCV 88 11/15/2021     (L) 11/15/2021     Lab Results   Component Value Date    TSH 3.660 11/15/2021      Lab Results   Component Value Date    GLUCOSE 92 11/15/2021    BUN 23 11/15/2021    CREATININE 1.13 11/15/2021    EGFRIFNONA 57 (L) 11/15/2021    EGFRIFAFRI 65 11/15/2021    BCR 20 11/15/2021    K 4.2 11/15/2021    CO2 24 11/15/2021    CALCIUM 9.4 11/15/2021    PROTENTOTREF 5.8 (L) 11/15/2021    ALBUMIN 3.8 11/15/2021    LABIL2 1.9 11/15/2021    AST 27 11/15/2021    ALT 13 11/15/2021     No results found for: DAVID, RF, SEDRATE   No results found for: CRP   No results found for: IRON, TIBC, FERRITIN   Lab Results   Component Value Date    [id number] 496 11/15/2021          Assessment/Plan      Medications        Problem List         LOS      Health maintenance.   Doing well, vaccines updated.  Discussed health maintenance, screening test, lifestyle modification.  Acute bacterial prostatitis.  Clinically improved/recheck UA/culture.  Actinic keratosis.  Bilateral face, much improved status post freezing /follow up  recheck, consider repeat freezing, resection if persistent symptoms. Freezing, consider resection if persistent symptoms.  Hypertension.  Improved today.  Overall good control.  Recently  started HCTZ follow-up recheck kidney function/electrolytes.  History of mechanical mitral valve replacement, A. fib, pacemaker in place.  Followed by cardiology Dr. Graff.  Tolerating Coumadin, signs and symptoms of bleeding discussed, dose adjusted today.  Hyperlipidemia good control on statin.  Discussed diet, exercise and lifestyle modification.  Recheck fasting labs.  Subconjunctival hemorrhage/eyelid bruising.  Spontaneous onset, no straining or cough, no trauma.  Benefit Coumadin outweighs risk currently but will attempt to keep INR below 3, Rx adjusted.  Follow-up monitor INR levels closely.  Signs and symptoms of bleeding discussed, call return if worsening symptoms.  Fatigue/leg weakness.       Persistent symptoms today, has completed Rx for bronchitis/bacterial prostatitis.. Repeat chest x-ray benign, DDx includes CHF exacerbation proBNP initially 2 to 3000, improved to 1100 on recheck, off losartan per cardiology, can consider restart.,   Echo 8/21 with normal EF.  Followed by cardiology Dr. Graff.  Status post inpatient stay/cardiac troponin negative, blood count, electrolytes, chest x-ray normal.  PT referrall scheduled.  Follow-up recheck.        Diagnoses and all orders for this visit:    1. Permanent atrial fibrillation (HCC) (Primary)  -     POC INR    2. Essential hypertension  -     POC INR    3. Mixed hyperlipidemia  -     POC INR    4. Skin lesion  -     POC INR    5. History of tobacco use  -     POC INR    6. Actinic keratosis    7. Coronary artery disease involving native coronary artery of native heart without angina pectoris    8. Acute diastolic CHF (congestive heart failure) (HCC)              Expected course, medications, and adverse effects discussed.  Call or return if worsening or persistent symptoms.  I wore protective equipment throughout this patient encounter including a mask, gloves, and eye protection.  Hand hygiene was performed before donning protective equipment and after  removal when leaving the room. The complete contents of the Assessment and Plan and Data/Lab Results as documented above have been reviewed and addressed by myself with the patient today as part of an ongoing evaluation / treatment plan.  If some of the documentation has been copied from a previous note and is unchanged it indicates that this problem / plan has been assessed today but is stable from a previous visit and no changes have been recommended.

## 2021-11-29 PROBLEM — I50.31 ACUTE DIASTOLIC CHF (CONGESTIVE HEART FAILURE) (HCC): Status: ACTIVE | Noted: 2021-01-01

## 2021-12-27 NOTE — PROGRESS NOTES
Coumadin Management:  Jean Pierre Lazo is a 89 y.o. male who presents for coumadin management. He is on anticoagulation due to atrial fibrillation.     The goal INR for this patient is 2.0-3.0. The patient has their INR checked 1 time per month    Symptoms included easy bruising.    Patient Findings     Positives:  Bruising    Negatives:  Signs/symptoms of thrombosis, Signs/symptoms of bleeding,   Laboratory test error suspected, Change in health, Change in alcohol use,   Change in activity, Upcoming invasive procedure, Emergency department   visit, Upcoming dental procedure, Missed doses, Extra doses, Change in   medications, Change in diet/appetite, Hospital admission, Other complaints        Clinical Outcomes     Negatives:  Major bleeding event, Thromboembolic event,   Anticoagulation-related hospital admission, Anticoagulation-related ED   visit, Anticoagulation-related fatality         Last INR:  Date: 11/29/2021  INR: 2.4  Warfarin change at last visit unchanged    Today's INR:  INR   Date Value Ref Range Status   12/27/2021 1.90 (A) 0.9 - 1.1 Final       Present warfarin dose is warfarin 2.5 mg PO daily, except warfarin 3 mg PO on mondays and fridays. . Dosing change include unchanged. Repeat INR in 2 weeks.    By report, Jean Pierre is compliant most of the time.

## 2022-01-01 ENCOUNTER — CLINICAL SUPPORT (OUTPATIENT)
Dept: FAMILY MEDICINE CLINIC | Facility: CLINIC | Age: 87
End: 2022-01-01

## 2022-01-01 ENCOUNTER — OFFICE VISIT (OUTPATIENT)
Dept: FAMILY MEDICINE CLINIC | Facility: CLINIC | Age: 87
End: 2022-01-01

## 2022-01-01 ENCOUNTER — TELEPHONE (OUTPATIENT)
Dept: FAMILY MEDICINE CLINIC | Facility: CLINIC | Age: 87
End: 2022-01-01

## 2022-01-01 VITALS
TEMPERATURE: 96.5 F | RESPIRATION RATE: 20 BRPM | HEART RATE: 78 BPM | HEIGHT: 70 IN | SYSTOLIC BLOOD PRESSURE: 144 MMHG | OXYGEN SATURATION: 97 % | DIASTOLIC BLOOD PRESSURE: 76 MMHG | WEIGHT: 171 LBS | BODY MASS INDEX: 24.48 KG/M2

## 2022-01-01 VITALS — HEIGHT: 70 IN | WEIGHT: 175 LBS | BODY MASS INDEX: 25.05 KG/M2

## 2022-01-01 VITALS
BODY MASS INDEX: 25.17 KG/M2 | HEART RATE: 62 BPM | TEMPERATURE: 97.3 F | WEIGHT: 175.8 LBS | HEIGHT: 70 IN | RESPIRATION RATE: 16 BRPM | OXYGEN SATURATION: 99 %

## 2022-01-01 VITALS
SYSTOLIC BLOOD PRESSURE: 140 MMHG | RESPIRATION RATE: 18 BRPM | WEIGHT: 173 LBS | HEIGHT: 70 IN | DIASTOLIC BLOOD PRESSURE: 76 MMHG | HEART RATE: 64 BPM | BODY MASS INDEX: 24.77 KG/M2 | TEMPERATURE: 97.1 F | OXYGEN SATURATION: 98 %

## 2022-01-01 VITALS — WEIGHT: 171 LBS | HEIGHT: 70 IN | BODY MASS INDEX: 24.48 KG/M2 | TEMPERATURE: 98.1 F

## 2022-01-01 VITALS
BODY MASS INDEX: 26.2 KG/M2 | HEART RATE: 62 BPM | WEIGHT: 183 LBS | TEMPERATURE: 96.8 F | DIASTOLIC BLOOD PRESSURE: 80 MMHG | RESPIRATION RATE: 18 BRPM | HEIGHT: 70 IN | SYSTOLIC BLOOD PRESSURE: 130 MMHG | OXYGEN SATURATION: 99 %

## 2022-01-01 VITALS
RESPIRATION RATE: 16 BRPM | SYSTOLIC BLOOD PRESSURE: 128 MMHG | OXYGEN SATURATION: 96 % | WEIGHT: 175.8 LBS | HEART RATE: 60 BPM | TEMPERATURE: 97.5 F | DIASTOLIC BLOOD PRESSURE: 60 MMHG | HEIGHT: 70 IN | BODY MASS INDEX: 25.17 KG/M2

## 2022-01-01 DIAGNOSIS — E66.3 OVERWEIGHT WITH BODY MASS INDEX (BMI) OF 25 TO 25.9 IN ADULT: ICD-10-CM

## 2022-01-01 DIAGNOSIS — Z98.818 OTHER DENTAL PROCEDURE STATUS: ICD-10-CM

## 2022-01-01 DIAGNOSIS — K13.79 MOUTH SORE: Primary | ICD-10-CM

## 2022-01-01 DIAGNOSIS — J01.90 ACUTE SINUSITIS, RECURRENCE NOT SPECIFIED, UNSPECIFIED LOCATION: ICD-10-CM

## 2022-01-01 DIAGNOSIS — I48.21 PERMANENT ATRIAL FIBRILLATION: ICD-10-CM

## 2022-01-01 DIAGNOSIS — I05.9 MITRAL VALVE DISEASE: ICD-10-CM

## 2022-01-01 DIAGNOSIS — R60.9 EDEMA, UNSPECIFIED TYPE: ICD-10-CM

## 2022-01-01 DIAGNOSIS — Z95.2 HISTORY OF HEART VALVE REPLACEMENT: ICD-10-CM

## 2022-01-01 DIAGNOSIS — Z95.2 HISTORY OF MITRAL VALVE REPLACEMENT: ICD-10-CM

## 2022-01-01 DIAGNOSIS — H61.23 EXCESSIVE EAR WAX, BILATERAL: ICD-10-CM

## 2022-01-01 DIAGNOSIS — Z87.891 HISTORY OF TOBACCO USE: ICD-10-CM

## 2022-01-01 DIAGNOSIS — I48.21 PERMANENT ATRIAL FIBRILLATION: Primary | ICD-10-CM

## 2022-01-01 DIAGNOSIS — D22.9 SUSPICIOUS NEVUS: ICD-10-CM

## 2022-01-01 DIAGNOSIS — Z79.01 ANTICOAGULATION MONITORING, INR RANGE 2-3: ICD-10-CM

## 2022-01-01 DIAGNOSIS — C06.9 ORAL-MOUTH CANCER: Primary | ICD-10-CM

## 2022-01-01 DIAGNOSIS — K13.0 LIP LESION: ICD-10-CM

## 2022-01-01 DIAGNOSIS — H61.23 IMPACTED CERUMEN, BILATERAL: ICD-10-CM

## 2022-01-01 DIAGNOSIS — E78.2 MIXED HYPERLIPIDEMIA: ICD-10-CM

## 2022-01-01 DIAGNOSIS — K12.2 ORAL INFECTION: ICD-10-CM

## 2022-01-01 DIAGNOSIS — I10 ESSENTIAL HYPERTENSION: ICD-10-CM

## 2022-01-01 DIAGNOSIS — L98.9 SKIN LESION OF FACE: ICD-10-CM

## 2022-01-01 LAB
INR PPP: 2 (ref 2–3)
INR PPP: 2.1 (ref 2–3)
INR PPP: 2.4 (ref 2–3)
INR PPP: 2.5 (ref 2–3)
INR PPP: 3.1 (ref 0.9–1.1)
INR PPP: 3.3 (ref 2–3)

## 2022-01-01 PROCEDURE — 85610 PROTHROMBIN TIME: CPT | Performed by: FAMILY MEDICINE

## 2022-01-01 PROCEDURE — 36416 COLLJ CAPILLARY BLOOD SPEC: CPT | Performed by: FAMILY MEDICINE

## 2022-01-01 PROCEDURE — 93793 ANTICOAG MGMT PT WARFARIN: CPT | Performed by: FAMILY MEDICINE

## 2022-01-01 PROCEDURE — 99443 PR PHYS/QHP TELEPHONE EVALUATION 21-30 MIN: CPT | Performed by: FAMILY MEDICINE

## 2022-01-01 PROCEDURE — 99214 OFFICE O/P EST MOD 30 MIN: CPT | Performed by: FAMILY MEDICINE

## 2022-01-01 RX ORDER — WARFARIN SODIUM 3 MG/1
TABLET ORAL
Qty: 30 TABLET | Refills: 0 | Status: SHIPPED | OUTPATIENT
Start: 2022-01-01

## 2022-01-01 RX ORDER — LOVASTATIN 20 MG/1
40 TABLET ORAL DAILY
Qty: 180 TABLET | Refills: 3 | Status: SHIPPED | OUTPATIENT
Start: 2022-01-01

## 2022-01-01 RX ORDER — CEPHALEXIN 500 MG/1
500 CAPSULE ORAL 3 TIMES DAILY
Qty: 30 CAPSULE | Refills: 0 | Status: SHIPPED | OUTPATIENT
Start: 2022-01-01 | End: 2022-01-01

## 2022-01-01 RX ORDER — FUROSEMIDE 20 MG/1
TABLET ORAL
Qty: 30 TABLET | Refills: 0 | Status: SHIPPED | OUTPATIENT
Start: 2022-01-01 | End: 2022-01-01 | Stop reason: SDUPTHER

## 2022-01-01 RX ORDER — HYDROCODONE BITARTRATE AND ACETAMINOPHEN 5; 325 MG/1; MG/1
TABLET ORAL
Qty: 60 TABLET | Refills: 0 | Status: SHIPPED | OUTPATIENT
Start: 2022-01-01

## 2022-01-01 RX ORDER — DOCUSATE SODIUM 50 MG/5 ML
LIQUID (ML) ORAL
Qty: 237 ML | Refills: 1 | Status: SHIPPED | OUTPATIENT
Start: 2022-01-01 | End: 2022-01-01 | Stop reason: SDUPTHER

## 2022-01-01 RX ORDER — POTASSIUM CHLORIDE 20 MEQ/1
TABLET, EXTENDED RELEASE ORAL
Qty: 30 TABLET | Refills: 3 | Status: SHIPPED | OUTPATIENT
Start: 2022-01-01

## 2022-01-01 RX ORDER — AMOXICILLIN 500 MG/1
CAPSULE ORAL
Qty: 4 CAPSULE | Refills: 2 | Status: SHIPPED | OUTPATIENT
Start: 2022-01-01 | End: 2022-01-01

## 2022-01-01 RX ORDER — WARFARIN SODIUM 1 MG/1
TABLET ORAL
Qty: 270 TABLET | Refills: 0 | Status: SHIPPED | OUTPATIENT
Start: 2022-01-01

## 2022-01-01 RX ORDER — WARFARIN SODIUM 3 MG/1
3 TABLET ORAL DAILY
Qty: 30 TABLET | Refills: 0 | Status: SHIPPED | OUTPATIENT
Start: 2022-01-01 | End: 2022-01-01

## 2022-01-01 RX ORDER — MORPHINE SULFATE 20 MG/ML
SOLUTION ORAL
COMMUNITY
Start: 2022-01-01

## 2022-01-01 RX ORDER — OXYCODONE HYDROCHLORIDE 5 MG/1
TABLET ORAL
COMMUNITY
Start: 2022-01-01

## 2022-01-01 RX ORDER — FUROSEMIDE 20 MG/1
TABLET ORAL
Qty: 30 TABLET | Refills: 0 | Status: SHIPPED | OUTPATIENT
Start: 2022-01-01

## 2022-01-01 RX ORDER — MORPHINE SULFATE 15 MG/1
TABLET, FILM COATED, EXTENDED RELEASE ORAL
COMMUNITY
Start: 2022-01-01

## 2022-01-01 RX ORDER — OXYCODONE HYDROCHLORIDE 5 MG/1
5 TABLET ORAL
COMMUNITY
Start: 2022-01-01

## 2022-01-03 NOTE — TELEPHONE ENCOUNTER
Caller: HENRY WALKER    Relationship: Emergency Contact    *[phone] (H)    Requested Prescriptions:   Requested Prescriptions     Pending Prescriptions Disp Refills   • furosemide (LASIX) 20 MG tablet 30 tablet 0     Sig: TAKE 1 TABLET BY MOUTH ONCE DAILY IN THE MORNING 3  DAYS  A  WEEK        Pharmacy where request should be sent:      Additional details provided by patient: PHARMACY STATES THEY DO NOT HAVE THE REFILLS FOR THIS MED    Does the patient have less than a 3 day supply:  [x] Yes  [] No    Gerry Alfaro Rep   01/03/22 13:43 EST

## 2022-01-10 NOTE — PROGRESS NOTES
Subjective   Jean Pierre Lazo is a 89 y.o. male.     Chief Complaint   Patient presents with   • Atrial Fibrillation   • ear cleaning       Jean Pierre is having his hearing aides fitted and they have requested he go to his pcp and have his ears cleaned out prior to.  Coumadin Management:  He is on anticoagulation due to atrial fibrillation.   The goal INR for this patient is 2.0-3.0. The patient has their INR checked 2 times per month  Symptoms included easy bruising.  Last INR:  Date: 2021  INR: 1.9  Warfarin change at last visit unchanged  Present warfarin dose is warfarin 2.5 mg PO daily, except warfarin 3 mg PO on  and . . Dosing change include unchanged. Repeat INR in 2022          Atrial Fibrillation  Presents for follow-up visit. Symptoms include hypertension and weakness. Symptoms are negative for chest pain, dizziness, hypotension, palpitations, shortness of breath and syncope. The symptoms have been stable (permanent). Past medical history includes atrial fibrillation.   Ear Fullness   There is pain in both ears. This is a recurrent problem. There has been no fever. The patient is experiencing no pain. Associated symptoms include ear discharge and hearing loss. Pertinent negatives include no abdominal pain. His past medical history is significant for hearing loss.            I personally reviewed and updated the patient's allergies, medications, problem list, and past medical, surgical, social, and family history. I have reviewed and confirmed the accuracy of the History of Present Illness and Review of Symptoms as documented by the MA/ADAM/RN. Zeke Cage MD    Family History   Problem Relation Age of Onset   • No Known Problems Mother    • No Known Problems Father    • No Known Problems Sister    • No Known Problems Brother        Social History     Tobacco Use   • Smoking status: Former Smoker     Types: Cigarettes     Quit date:      Years since quittin.0   • Smokeless  tobacco: Never Used   Vaping Use   • Vaping Use: Never used   Substance Use Topics   • Alcohol use: No   • Drug use: No       Past Surgical History:   Procedure Laterality Date   • CARDIAC CATHETERIZATION     • CARDIAC VALVE SURGERY     • CHOLECYSTECTOMY     • PACEMAKER IMPLANTATION         Patient Active Problem List   Diagnosis   • Permanent atrial fibrillation (HCC)   • Atrioventricular block   • Cerebrovascular accident (CVA) (HCC)   • Cholelithiasis with cholecystitis   • Coronary artery disease   • History of heart valve replacement   • Mixed hyperlipidemia   • Essential hypertension   • Mitral valve disease   • Presence of aortocoronary bypass graft   • Presence of cardiac pacemaker   • Medicare annual wellness visit, subsequent   • Bilateral impacted cerumen   • Actinic keratosis   • Eye redness   • Subconjunctival hemorrhage of right eye   • Malignant neoplasm of colon (HCC)   • Hospital discharge follow-up   • Shortness of breath   • History of tobacco use   • Skin lesion   • Malaise and fatigue   • Acute diastolic CHF (congestive heart failure) (HCC)   • Lip lesion         Current Outpatient Medications:   •  furosemide (LASIX) 20 MG tablet, TAKE 1 TABLET BY MOUTH ONCE DAILY IN THE MORNING 3  DAYS  A  WEEK, Disp: 30 tablet, Rfl: 0  •  lovastatin (MEVACOR) 20 MG tablet, Take 2 tablets by mouth once daily, Disp: 180 tablet, Rfl: 0  •  metoprolol tartrate (LOPRESSOR) 25 MG tablet, Take 0.5 tablets by mouth 2 (Two) Times a Day., Disp: 90 tablet, Rfl: 2  •  Multiple Vitamins-Minerals (MULTI VITAMIN/MINERALS) tablet, MULTI VITAMIN/MINERALS TABS, Disp: , Rfl:   •  niacin 500 MG tablet, Take 500 mg by mouth Every Night., Disp: , Rfl:   •  omeprazole (priLOSEC) 40 MG capsule, Take 40 mg by mouth Daily., Disp: , Rfl:   •  potassium chloride (K-DUR,KLOR-CON) 20 MEQ CR tablet, Take 1 tablet by mouth 3 days a week with furosemide (Patient taking differently: Daily. Take 1 tablet by mouth 3 days a week with  "furosemide), Disp: 30 tablet, Rfl: 3  •  warfarin (COUMADIN) 3 MG tablet, Take 1 tablet by mouth once daily, Disp: 45 tablet, Rfl: 0  •  amoxicillin (AMOXIL) 500 MG capsule, Take 4 capsules by mouth 1 hour before dental procedure., Disp: 4 capsule, Rfl: 2  •  cephalexin (KEFLEX) 500 MG capsule, Take 1 capsule by mouth 3 (Three) Times a Day., Disp: 30 capsule, Rfl: 0  •  docusate (COLACE) 50 MG/5ML liquid, Apply 4 drops in each ear nightly with dropper as needed for earwax buildup., Disp: 237 mL, Rfl: 1         Review of Systems   Constitutional: Negative for chills and diaphoresis.   HENT: Positive for ear discharge and hearing loss.    Eyes: Negative for visual disturbance.   Respiratory: Negative for shortness of breath.    Cardiovascular: Negative for chest pain, palpitations and syncope.   Gastrointestinal: Negative for abdominal pain and nausea.   Endocrine: Negative for polydipsia and polyphagia.   Musculoskeletal: Negative for neck stiffness.   Skin: Negative for color change and pallor.   Neurological: Positive for weakness. Negative for dizziness, seizures and syncope.   Hematological: Negative for adenopathy.   Psychiatric/Behavioral: Negative for hallucinations and suicidal ideas.       I have reviewed and confirmed the accuracy of the ROS as documented by the MA/LPN/RN Zeke Cage MD      Objective   Pulse 62   Temp 97.3 °F (36.3 °C)   Resp 16   Ht 177.8 cm (70\")   Wt 79.7 kg (175 lb 12.8 oz)   SpO2 99%   BMI 25.22 kg/m²   BP Readings from Last 3 Encounters:   12/27/21 147/78   11/29/21 132/80   10/26/21 128/88     Wt Readings from Last 3 Encounters:   01/11/22 79.7 kg (175 lb 12.8 oz)   12/27/21 80.5 kg (177 lb 6.4 oz)   11/29/21 80.4 kg (177 lb 3.2 oz)     Physical Exam  Constitutional:       Appearance: Normal appearance. He is well-developed. He is not diaphoretic.   HENT:      Head: Normocephalic.      Right Ear: Hearing, tympanic membrane, ear canal and external ear normal.      Left Ear: " Hearing, tympanic membrane, ear canal and external ear normal.      Nose: Nose normal. No mucosal edema or congestion.      Right Sinus: No maxillary sinus tenderness or frontal sinus tenderness.      Left Sinus: No maxillary sinus tenderness or frontal sinus tenderness.      Mouth/Throat:      Mouth: No oral lesions.      Pharynx: Uvula midline. No oropharyngeal exudate or posterior oropharyngeal erythema.      Tonsils: No tonsillar exudate.   Eyes:      General: Lids are normal.      Conjunctiva/sclera: Conjunctivae normal.      Pupils: Pupils are equal, round, and reactive to light.   Neck:      Thyroid: No thyromegaly.      Vascular: No carotid bruit or JVD.      Trachea: No tracheal deviation.   Cardiovascular:      Rate and Rhythm: Normal rate and regular rhythm.      Pulses: Normal pulses.      Heart sounds: Normal heart sounds, S1 normal and S2 normal. No murmur heard.  No friction rub. No gallop.    Pulmonary:      Effort: Pulmonary effort is normal. No accessory muscle usage.      Breath sounds: Normal breath sounds. No stridor. No decreased breath sounds, wheezing, rhonchi or rales.   Abdominal:      General: Bowel sounds are normal. There is no distension.      Palpations: Abdomen is soft. Abdomen is not rigid. There is no mass or pulsatile mass.      Tenderness: There is no abdominal tenderness. There is no guarding or rebound. Negative signs include Daigle's sign.      Hernia: No hernia is present.   Lymphadenopathy:      Head:      Right side of head: No submental, submandibular, tonsillar, preauricular, posterior auricular or occipital adenopathy.      Left side of head: No submental, submandibular, tonsillar, preauricular, posterior auricular or occipital adenopathy.      Cervical: No cervical adenopathy.   Skin:     General: Skin is warm and dry.      Coloration: Skin is not pale.      Comments: Small area of inflammation left lower lip, no induration or fluctuance   Neurological:      Mental  Status: He is alert and oriented to person, place, and time.      Cranial Nerves: No cranial nerve deficit.      Sensory: No sensory deficit.      Coordination: Coordination normal.      Gait: Gait normal.      Deep Tendon Reflexes: Reflexes are normal and symmetric.         Data / Lab Results:    Hemoglobin A1C   Date Value Ref Range Status   06/06/2016 4.8 4.6 - 7.1 % Final        Lab Results   Component Value Date    LDL 74 11/15/2021    LDL 69 10/09/2020    LDL 71 08/02/2019     Lab Results   Component Value Date    CHOL 136 07/20/2018    CHOL 166 07/17/2017    CHOL 157 05/03/2016     Lab Results   Component Value Date    TRIG 103 11/15/2021    TRIG 125 10/09/2020    TRIG 101 08/02/2019     Lab Results   Component Value Date    HDL 56 11/15/2021    HDL 48 10/09/2020    HDL 53 08/02/2019     No results found for: PSA  Lab Results   Component Value Date    WBC 5.9 11/15/2021    HGB 14.2 11/15/2021    HCT 41.9 11/15/2021    MCV 88 11/15/2021     (L) 11/15/2021     Lab Results   Component Value Date    TSH 3.660 11/15/2021      Lab Results   Component Value Date    GLUCOSE 92 11/15/2021    BUN 23 11/15/2021    CREATININE 1.13 11/15/2021    EGFRIFNONA 57 (L) 11/15/2021    EGFRIFAFRI 65 11/15/2021    BCR 20 11/15/2021    K 4.2 11/15/2021    CO2 24 11/15/2021    CALCIUM 9.4 11/15/2021    PROTENTOTREF 5.8 (L) 11/15/2021    ALBUMIN 3.8 11/15/2021    LABIL2 1.9 11/15/2021    AST 27 11/15/2021    ALT 13 11/15/2021     No results found for: DAVID, RF, SEDRATE   No results found for: CRP   No results found for: IRON, TIBC, FERRITIN   Lab Results   Component Value Date    LMGNCDQP87 496 11/15/2021          Assessment/Plan      Medications        Problem List         LOS      Health maintenance.   Doing well, vaccines updated.  Discussed health maintenance, screening test, lifestyle modification.  Acute bacterial prostatitis.  Clinically improved/recheck UA/culture.  Actinic keratosis.  Bilateral face, much improved  status post freezing /follow up  recheck, consider repeat freezing, resection if persistent symptoms. Freezing, consider resection if persistent symptoms.  Hypertension.  Improved today.  Overall good control.  Recently started HCTZ follow-up recheck kidney function/electrolytes.  History of mechanical mitral valve replacement, A. fib, pacemaker in place.  Followed by cardiology Dr. Graff.  Tolerating Coumadin, signs and symptoms of bleeding discussed, dose adjusted today.  Hyperlipidemia good control on statin.  Discussed diet, exercise and lifestyle modification.  Recheck fasting labs.  Subconjunctival hemorrhage/eyelid bruising.  Spontaneous onset, no straining or cough, no trauma.  Benefit Coumadin outweighs risk currently but will attempt to keep INR below 3, Rx adjusted.  Follow-up monitor INR levels closely.  Signs and symptoms of bleeding discussed, call return if worsening symptoms.  Fatigue/leg weakness.       Persistent symptoms today, has completed Rx for bronchitis/bacterial prostatitis.. Repeat chest x-ray benign, DDx includes CHF exacerbation proBNP initially to 3000, improved to 1100 on recheck, off losartan per cardiology, can consider restart.,   Echo 8/21 with normal EF.  Followed by cardiology Dr. Graff.  Status post inpatient stay/cardiac troponin negative, blood count, electrolytes, chest x-ray normal.  PT referrall scheduled.  Follow-up recheck.  Cerumen impaction. Improved/partially cleared with irrigation today. Start drops. Consider HEENT referral if persistent symptoms.  Hearing loss. [facility].  Lip lesion. By 2 weeks, likely secondary to trauma/infection. Start antibiotics. Consider dermatology referral/resection if lesion persists.      Diagnoses and all orders for this visit:    1. Permanent atrial fibrillation (HCC) (Primary)  -     POC INR    2. Acute sinusitis, recurrence not specified, unspecified location  -     cephalexin (KEFLEX) 500  MG capsule; Take 1 capsule by mouth 3 (Three) Times a Day.  Dispense: 30 capsule; Refill: 0    3. Impacted cerumen, bilateral  -     Discontinue: docusate sodium (COLACE) 50 mg/5 mL liquid; drop 2 drops into each ear nightly as needed for ear wax build up using dropper.  Dispense: 237 mL; Refill: 1  -     docusate (COLACE) 50 MG/5ML liquid; Apply 4 drops in each ear nightly with dropper as needed for earwax buildup.  Dispense: 237 mL; Refill: 1    4. Excessive ear wax, bilateral  -     docusate (COLACE) 50 MG/5ML liquid; Apply 4 drops in each ear nightly with dropper as needed for earwax buildup.  Dispense: 237 mL; Refill: 1    5. Other dental procedure status  -     amoxicillin (AMOXIL) 500 MG capsule; Take 4 capsules by mouth 1 hour before dental procedure.  Dispense: 4 capsule; Refill: 2    6. Oral infection  -     amoxicillin (AMOXIL) 500 MG capsule; Take 4 capsules by mouth 1 hour before dental procedure.  Dispense: 4 capsule; Refill: 2    7. Lip lesion              Expected course, medications, and adverse effects discussed.  Call or return if worsening or persistent symptoms.  I wore protective equipment throughout this patient encounter including a mask, gloves, and eye protection.  Hand hygiene was performed before donning protective equipment and after removal when leaving the room. The complete contents of the Assessment and Plan and Data/Lab Results as documented above have been reviewed and addressed by myself with the patient today as part of an ongoing evaluation / treatment plan.  If some of the documentation has been copied from a previous note and is unchanged it indicates that this problem / plan has been assessed today but is stable from a previous visit and no changes have been recommended.

## 2022-01-11 PROBLEM — K13.0 LIP LESION: Status: ACTIVE | Noted: 2022-01-01

## 2022-01-24 NOTE — PROGRESS NOTES
Coumadin Management:  Jean Pierre Lazo is a 89 y.o. male who presents for coumadin management. He is on anticoagulation due to atrial fibrillation.     The goal INR for this patient is 2.0-3.0. The patient has their INR checked 1 time per month    Symptoms included easy bruising.    Patient Findings     Positives:  Bruising    Negatives:  Signs/symptoms of thrombosis, Signs/symptoms of bleeding,   Laboratory test error suspected, Change in health, Change in alcohol use,   Change in activity, Upcoming invasive procedure, Emergency department   visit, Upcoming dental procedure, Missed doses, Extra doses, Change in   medications, Change in diet/appetite, Hospital admission, Other complaints        Clinical Outcomes     Negatives:  Major bleeding event, Thromboembolic event,   Anticoagulation-related hospital admission, Anticoagulation-related ED   visit, Anticoagulation-related fatality         Last INR:  Date: 1/11/2022  INR: 2.0  Warfarin change at last visit unchanged    Today's INR:  INR   Date Value Ref Range Status   01/24/2022 2.10 2 - 3 Final       Present warfarin dose is warfarin 2.5 mg PO daily, except warfarin 3 mg PO on monday and friday.  Dosing change include unchanged. Repeat INR in 1 month.    By report, Jean Pierre is compliant most of the time.

## 2022-02-01 NOTE — TELEPHONE ENCOUNTER
Caller: HENRY WALKER    Relationship: Emergency Contact    Best call back number: [phone]    Requested Prescriptions:   Requested Prescriptions     Pending Prescriptions Disp Refills   • warfarin (COUMADIN) 3 MG tablet 45 tablet 0     Sig: Take 1 tablet by mouth Daily.   • lovastatin (MEVACOR) 20 MG tablet 180 tablet 0     Sig: Take 2 tablets by mouth Daily.   • potassium chloride (K-DUR,KLOR-CON) 20 MEQ CR tablet 30 tablet 3     Sig: Take 1 tablet by mouth 3 days a week with furosemide   • furosemide (LASIX) 20 MG tablet 30 tablet 0     Sig: TAKE 1 TABLET BY MOUTH ONCE DAILY IN THE MORNING 3  DAYS  A  WEEK        Pharmacy where request should be sent: [facility] [address] [place] [address] [id number] [facility] [address]  - [phone] [facility] [phone] FX     Additional details provided by patient: PATIENT HAS A 4-5 DAY SUPPLY     Does the patient have less than a 3 day supply:  [] Yes  [x] No    Gerry Mendenhall   02/01/22 14:03 EST

## 2022-02-21 NOTE — TELEPHONE ENCOUNTER
Caller: Jean Pierre Lazo    Relationship to patient: Self    Best call back number: [phone]    Patient is needing: PATIENT HAS APPT 2/25 FOR PT INR CHECK. PATIENT STATES THAT HE HAS A SORE IN HIS MOUTH AND IS WANTED DR DALTON TO LOOK AT THIS DURING HIS INR APPT.     ADVISED PATIENT THAT DR DALTON DOES NOT HAVE AVAILABILITY, REQUESTING TO SEE IF HE CAN BE WORKED IN ON Friday

## 2022-02-23 NOTE — PROGRESS NOTES
Subjective   Jean Pierre Lazo is a 89 y.o. male.     Chief Complaint   Patient presents with   • Mouth Lesions   • Anticoagulation       Jean Pierre presents today with a mouth sore that appeared over a month ago. He reports that it is increasing in size on mouth outside and inside of mouth. He reports that it is causing him significant pain. Worsening at night. He takes tylenol to help with pain.     Mouth Lesions   The current episode started more than 1 week ago. The onset was gradual. The problem has been gradually worsening. The problem is severe. The symptoms are relieved by acetaminophen. The symptoms are aggravated by eating, drinking and movement. Associated symptoms include mouth sores. Pertinent negatives include no abdominal pain and no nausea. There were no sick contacts.            I personally reviewed and updated the patient's allergies, medications, problem list, and past medical, surgical, social, and family history. I have reviewed and confirmed the accuracy of the History of Present Illness and Review of Symptoms as documented by the MA/LPN/RN. Zeke Cage MD    Family History   Problem Relation Age of Onset   • No Known Problems Mother    • No Known Problems Father    • No Known Problems Sister    • No Known Problems Brother        Social History     Tobacco Use   • Smoking status: Former Smoker     Types: Cigarettes     Quit date:      Years since quittin.1   • Smokeless tobacco: Never Used   Vaping Use   • Vaping Use: Never used   Substance Use Topics   • Alcohol use: No   • Drug use: No       Past Surgical History:   Procedure Laterality Date   • CARDIAC CATHETERIZATION     • CARDIAC VALVE SURGERY     • CHOLECYSTECTOMY     • PACEMAKER IMPLANTATION         Patient Active Problem List   Diagnosis   • Permanent atrial fibrillation (HCC)   • Atrioventricular block   • Cerebrovascular accident (CVA) (HCC)   • Cholelithiasis with cholecystitis   • Coronary artery disease   • History of heart  valve replacement   • Mixed hyperlipidemia   • Essential hypertension   • Mitral valve disease   • Presence of aortocoronary bypass graft   • Presence of cardiac pacemaker   • Medicare annual wellness visit, subsequent   • Bilateral impacted cerumen   • Actinic keratosis   • Eye redness   • Subconjunctival hemorrhage of right eye   • Malignant neoplasm of colon (HCC)   • Hospital discharge follow-up   • Shortness of breath   • History of tobacco use   • Skin lesion   • Malaise and fatigue   • Acute diastolic CHF (congestive heart failure) (HCC)   • Lip lesion         Current Outpatient Medications:   •  docusate (COLACE) 50 MG/5ML liquid, Apply 4 drops in each ear nightly with dropper as needed for earwax buildup., Disp: 237 mL, Rfl: 1  •  furosemide (LASIX) 20 MG tablet, TAKE 1 TABLET BY MOUTH ONCE DAILY IN THE MORNING 3  DAYS  A  WEEK, Disp: 30 tablet, Rfl: 0  •  lovastatin (MEVACOR) 20 MG tablet, Take 2 tablets by mouth Daily., Disp: 180 tablet, Rfl: 3  •  metoprolol tartrate (LOPRESSOR) 25 MG tablet, Take 0.5 tablets by mouth 2 (Two) Times a Day., Disp: 90 tablet, Rfl: 2  •  Multiple Vitamins-Minerals (MULTI VITAMIN/MINERALS) tablet, MULTI VITAMIN/MINERALS TABS, Disp: , Rfl:   •  niacin 500 MG tablet, Take 500 mg by mouth Every Night., Disp: , Rfl:   •  omeprazole (priLOSEC) 40 MG capsule, Take 40 mg by mouth Daily., Disp: , Rfl:   •  potassium chloride (K-DUR,KLOR-CON) 20 MEQ CR tablet, Take 1 tablet by mouth 3 days a week with furosemide, Disp: 30 tablet, Rfl: 3  •  warfarin (COUMADIN) 3 MG tablet, Take 1 tablet by mouth Daily., Disp: 30 tablet, Rfl: 0         Review of Systems   Constitutional: Negative for chills and diaphoresis.   HENT: Positive for mouth sores.    Eyes: Negative for visual disturbance.   Respiratory: Negative for shortness of breath.    Cardiovascular: Negative for chest pain and palpitations.   Gastrointestinal: Negative for abdominal pain and nausea.   Endocrine: Negative for polydipsia  "and polyphagia.   Musculoskeletal: Negative for neck stiffness.   Skin: Negative for color change and pallor.   Neurological: Negative for seizures and syncope.   Hematological: Negative for adenopathy.       I have reviewed and confirmed the accuracy of the ROS as documented by the MA/LPN/RN Zeke Cage MD      Objective   /80   Pulse 62   Temp 96.8 °F (36 °C)   Resp 18   Ht 177.8 cm (70\")   Wt 83 kg (183 lb)   SpO2 99%   BMI 26.26 kg/m²   BP Readings from Last 3 Encounters:   02/23/22 130/80   01/24/22 140/76   12/27/21 147/78     Wt Readings from Last 3 Encounters:   02/23/22 83 kg (183 lb)   01/24/22 78.5 kg (173 lb)   01/11/22 79.7 kg (175 lb 12.8 oz)     Physical Exam  Constitutional:       Appearance: Normal appearance. He is well-developed. He is not diaphoretic.   HENT:      Right Ear: Hearing, tympanic membrane, ear canal and external ear normal.      Left Ear: Hearing, tympanic membrane, ear canal and external ear normal.      Nose: Nose normal. No mucosal edema or congestion.      Right Sinus: No maxillary sinus tenderness or frontal sinus tenderness.      Left Sinus: No maxillary sinus tenderness or frontal sinus tenderness.      Mouth/Throat:      Mouth: No oral lesions.      Pharynx: Uvula midline. No oropharyngeal exudate or posterior oropharyngeal erythema.      Tonsils: No tonsillar exudate.   Cardiovascular:      Rate and Rhythm: Normal rate and regular rhythm.      Pulses: Normal pulses.      Heart sounds: Normal heart sounds, S1 normal and S2 normal. No murmur heard.  No friction rub. No gallop.    Pulmonary:      Effort: Pulmonary effort is normal. No accessory muscle usage.      Breath sounds: Normal breath sounds. No stridor. No decreased breath sounds, wheezing, rhonchi or rales.   Abdominal:      General: Bowel sounds are normal. There is no distension.      Palpations: Abdomen is soft. Abdomen is not rigid. There is no mass or pulsatile mass.      Tenderness: There is no " abdominal tenderness. There is no guarding or rebound. Negative signs include Daigle's sign.      Hernia: No hernia is present.   Skin:     General: Skin is warm and dry.      Coloration: Skin is not pale.      Comments: Inflamed lesion left lip lower lip, persistent/enlarging evaluation of fluctuance   Neurological:      Mental Status: He is alert and oriented to person, place, and time.      Coordination: Coordination normal.      Gait: Gait normal.         Data / Lab Results:    Hemoglobin A1C   Date Value Ref Range Status   06/06/2016 4.8 4.6 - 7.1 % Final        Lab Results   Component Value Date    LDL 74 11/15/2021    LDL 69 10/09/2020    LDL 71 08/02/2019     Lab Results   Component Value Date    CHOL 136 07/20/2018    CHOL 166 07/17/2017    CHOL 157 05/03/2016     Lab Results   Component Value Date    TRIG 103 11/15/2021    TRIG 125 10/09/2020    TRIG 101 08/02/2019     Lab Results   Component Value Date    HDL 56 11/15/2021    HDL 48 10/09/2020    HDL 53 08/02/2019     No results found for: PSA  Lab Results   Component Value Date    WBC 5.9 11/15/2021    HGB 14.2 11/15/2021    HCT 41.9 11/15/2021    MCV 88 11/15/2021     (L) 11/15/2021     Lab Results   Component Value Date    TSH 3.660 11/15/2021      Lab Results   Component Value Date    GLUCOSE 92 11/15/2021    BUN 23 11/15/2021    CREATININE 1.13 11/15/2021    EGFRIFNONA 57 (L) 11/15/2021    EGFRIFAFRI 65 11/15/2021    BCR 20 11/15/2021    K 4.2 11/15/2021    CO2 24 11/15/2021    CALCIUM 9.4 11/15/2021    PROTENTOTREF 5.8 (L) 11/15/2021    ALBUMIN 3.8 11/15/2021    LABIL2 1.9 11/15/2021    AST 27 11/15/2021    ALT 13 11/15/2021     No results found for: DAVID, RF, SEDRATE   No results found for: CRP   No results found for: IRON, TIBC, FERRITIN   Lab Results   Component Value Date    [id number] 496 11/15/2021          Assessment/Plan      Medications        Problem List         LOS    Health maintenance.   Doing well, vaccines updated.  Discussed  health maintenance, screening test, lifestyle modification.  Acute bacterial prostatitis.  Clinically improved/recheck UA/culture.  Actinic keratosis.  Bilateral face, much improved status post freezing /follow up  recheck, consider repeat freezing, resection if persistent symptoms. Freezing, consider resection if persistent symptoms.  Hypertension.  Improved today.  Overall good control.  Recently started HCTZ follow-up recheck kidney function/electrolytes.  History of mechanical mitral valve replacement, A. fib, pacemaker in place.  Followed by cardiology Dr. Graff.  Tolerating Coumadin, signs and symptoms of bleeding discussed, dose adjusted today.  Hyperlipidemia good control on statin.  Discussed diet, exercise and lifestyle modification.  Recheck fasting labs.  Subconjunctival hemorrhage/eyelid bruising.  Spontaneous onset, no straining or cough, no trauma.  Benefit Coumadin outweighs risk currently but will attempt to keep INR below 3, Rx adjusted.  Follow-up monitor INR levels closely.  Signs and symptoms of bleeding discussed, call return if worsening symptoms.  Fatigue/leg weakness.       Persistent symptoms today, has completed Rx for bronchitis/bacterial prostatitis.. Repeat chest x-ray benign, DDx includes CHF exacerbation proBNP initially to 3000, improved to 1100 on recheck, off losartan per cardiology, can consider restart.,   Echo 8/21 with normal EF.  Followed by cardiology Dr. Graff.  Status post inpatient stay/cardiac troponin negative, blood count, electrolytes, chest x-ray normal.  PT referrall scheduled.  Follow-up recheck.  Cerumen impaction. Improved/partially cleared with irrigation today. Start drops. Consider HEENT referral if persistent symptoms.  H[facility] loss. [facility].  Lip lesion.  Left lower lip, persistent/worse today, inflamed, refractory to antibiotics, DDx includes cancer, dermatology referral scheduled.         Diagnoses and all  orders for this visit:    1. Mouth sore (Primary)    2. History of tobacco use    3. Permanent atrial fibrillation (HCC)    4. Skin lesion of face  -     Ambulatory Referral to Dermatology    5. Suspicious nevus  -     Ambulatory Referral to Dermatology    Other orders  -     POC INR              Expected course, medications, and adverse effects discussed.  Call or return if worsening or persistent symptoms.  I wore protective equipment throughout this patient encounter including a mask, gloves, and eye protection.  Hand hygiene was performed before donning protective equipment and after removal when leaving the room. The complete contents of the Assessment and Plan and Data/Lab Results as documented above have been reviewed and addressed by myself with the patient today as part of an ongoing evaluation / treatment plan.  If some of the documentation has been copied from a previous note and is unchanged it indicates that this problem / plan has been assessed today but is stable from a previous visit and no changes have been recommended.

## 2022-03-23 NOTE — PROGRESS NOTES
Coumadin Management:  Jean Pierre Lazo is a 89 y.o. male who presents for coumadin management. He is on anticoagulation due to atrial fibrillation.     The goal INR for this patient is 2.0-3.0. The patient has their INR checked 1 time per month    Symptoms included easy bruising.    Patient Findings     Positives:  Bruising    Negatives:  Signs/symptoms of thrombosis, Signs/symptoms of bleeding,   Laboratory test error suspected, Change in health, Change in alcohol use,   Change in activity, Upcoming invasive procedure, Emergency department   visit, Upcoming dental procedure, Missed doses, Extra doses, Change in   medications, Change in diet/appetite, Hospital admission, Other complaints        Clinical Outcomes     Negatives:  Major bleeding event, Thromboembolic event,   Anticoagulation-related hospital admission, Anticoagulation-related ED   visit, Anticoagulation-related fatality         Last INR:  Date: 2/23/2022  INR: 2.5  Warfarin change at last visit unchanged    Today's INR:  INR   Date Value Ref Range Status   03/23/2022 2.40 2 - 3 Final       Present warfarin dose is warfarin 2.5 mg PO daily, except warfarin 5 mg PO on mondays and fridays. . Dosing change include unchanged. Repeat INR in 1 month.    By report, Jean Pierre is compliant most of the time.

## 2022-04-11 PROBLEM — C06.9 ORAL-MOUTH CANCER (HCC): Status: ACTIVE | Noted: 2022-01-01

## 2022-04-11 NOTE — TELEPHONE ENCOUNTER
Caller: MARLENA AARON    Relationship: Other Relative    Best call back number: [phone]    What medication are you requesting: HYDROCODONE     What are your current symptoms: PAIN IN MOUTH, CANCER    How long have you been experiencing symptoms: COUPLE OF WEEKS    Have you had these symptoms before:    [] Yes  [x] No    Have you been treated for these symptoms before:   [] Yes  [x] No    If a prescription is needed, what is your preferred pharmacy and phone number:      [facility] [address], [place] - [address]  - [phone] [facility] [phone] FX       Additional notes: PATIENT HAS CANCER AND HIS MOUTH IS IN A LOT OF PAIN. PLEASE REACH OUT IF HE NEEDS TO BE SEEN FIRST

## 2022-04-11 NOTE — PROGRESS NOTES
Subjective   Jean Pierre Lazo is a 89 y.o. male.     Chief Complaint   Patient presents with   • Oral Pain       Oral Pain   This is a recurrent problem. The current episode started more than 1 month ago. The problem occurs constantly (WORSENING AT NIGHT ). Associated symptoms include facial pain and oral bleeding. He has tried nothing for the symptoms.            I personally reviewed and updated the patient's allergies, medications, problem list, and past medical, surgical, social, and family history. I have reviewed and confirmed the accuracy of the History of Present Illness and Review of Symptoms as documented by the MA/LPN/RN. Zeke Cage MD    Family History   Problem Relation Age of Onset   • No Known Problems Mother    • No Known Problems Father    • No Known Problems Sister    • No Known Problems Brother        Social History     Tobacco Use   • Smoking status: Former Smoker     Types: Cigarettes     Quit date:      Years since quittin.3   • Smokeless tobacco: Never Used   Vaping Use   • Vaping Use: Never used   Substance Use Topics   • Alcohol use: No   • Drug use: No       Past Surgical History:   Procedure Laterality Date   • CARDIAC CATHETERIZATION     • CARDIAC VALVE SURGERY     • CHOLECYSTECTOMY     • PACEMAKER IMPLANTATION         Patient Active Problem List   Diagnosis   • Permanent atrial fibrillation (HCC)   • Atrioventricular block   • Cerebrovascular accident (CVA) (HCC)   • Cholelithiasis with cholecystitis   • Coronary artery disease   • History of heart valve replacement   • Mixed hyperlipidemia   • Essential hypertension   • Mitral valve disease   • Presence of aortocoronary bypass graft   • Presence of cardiac pacemaker   • Medicare annual wellness visit, subsequent   • Bilateral impacted cerumen   • Actinic keratosis   • Eye redness   • Subconjunctival hemorrhage of right eye   • Malignant neoplasm of colon (HCC)   • Hospital discharge follow-up   • Shortness of breath   •  History of tobacco use   • Skin lesion   • Malaise and fatigue   • Acute diastolic CHF (congestive heart failure) (HCC)   • Lip lesion   • Oral-mouth cancer (HCC)         Current Outpatient Medications:   •  docusate (COLACE) 50 MG/5ML liquid, Apply 4 drops in each ear nightly with dropper as needed for earwax buildup., Disp: 237 mL, Rfl: 1  •  furosemide (LASIX) 20 MG tablet, TAKE 1 TABLET BY MOUTH ONCE DAILY IN THE MORNING 3  DAYS  A  WEEK, Disp: 30 tablet, Rfl: 0  •  lovastatin (MEVACOR) 20 MG tablet, Take 2 tablets by mouth Daily., Disp: 180 tablet, Rfl: 3  •  metoprolol tartrate (LOPRESSOR) 25 MG tablet, Take 0.5 tablets by mouth 2 (Two) Times a Day., Disp: 90 tablet, Rfl: 2  •  Multiple Vitamins-Minerals (MULTI VITAMIN/MINERALS) tablet, MULTI VITAMIN/MINERALS TABS, Disp: , Rfl:   •  niacin 500 MG tablet, Take 500 mg by mouth Every Night., Disp: , Rfl:   •  omeprazole (priLOSEC) 40 MG capsule, Take 40 mg by mouth Daily., Disp: , Rfl:   •  potassium chloride (K-DUR,KLOR-CON) 20 MEQ CR tablet, Take 1 tablet by mouth 3 days a week with furosemide, Disp: 30 tablet, Rfl: 3  •  warfarin (COUMADIN) 1 MG tablet, TAKE 1 TO 3 TABLETS BY MOUTH ONCE DAILY AT NIGHT, Disp: 270 tablet, Rfl: 0  •  warfarin (COUMADIN) 3 MG tablet, Take 1 tablet by mouth Daily., Disp: 30 tablet, Rfl: 0  •  HYDROcodone-acetaminophen (NORCO) 5-325 MG per tablet, 1/2 to 1 tablet every 4-6 hours as needed, Disp: 60 tablet, Rfl: 0         Review of Systems   Constitutional: Negative for chills and diaphoresis.   Eyes: Negative for visual disturbance.   Respiratory: Negative for shortness of breath.    Cardiovascular: Negative for chest pain and palpitations.   Gastrointestinal: Negative for abdominal pain and nausea.   Endocrine: Negative for polydipsia and polyphagia.   Genitourinary: Positive for scrotal swelling.   Musculoskeletal: Negative for neck stiffness.   Skin: Negative for color change and pallor.   Neurological: Negative for seizures and  "syncope.   Hematological: Negative for adenopathy.       I have reviewed and confirmed the accuracy of the ROS as documented by the MA/LPN/RN Zeke Cage MD      Objective   Temp 98.1 °F (36.7 °C)   Ht 177.8 cm (70\")   Wt 77.6 kg (171 lb)   BMI 24.54 kg/m²   BP Readings from Last 3 Encounters:   03/23/22 144/76   02/23/22 130/80   01/24/22 140/76     Wt Readings from Last 3 Encounters:   04/11/22 77.6 kg (171 lb)   03/23/22 77.6 kg (171 lb)   02/23/22 83 kg (183 lb)     Physical Exam    Data / Lab Results:    Hemoglobin A1C   Date Value Ref Range Status   06/06/2016 4.8 4.6 - 7.1 % Final        Lab Results   Component Value Date    LDL 74 11/15/2021    LDL 69 10/09/2020    LDL 71 08/02/2019     Lab Results   Component Value Date    CHOL 136 07/20/2018    CHOL 166 07/17/2017    CHOL 157 05/03/2016     Lab Results   Component Value Date    TRIG 103 11/15/2021    TRIG 125 10/09/2020    TRIG 101 08/02/2019     Lab Results   Component Value Date    HDL 56 11/15/2021    HDL 48 10/09/2020    HDL 53 08/02/2019     No results found for: PSA  Lab Results   Component Value Date    WBC 5.9 11/15/2021    HGB 14.2 11/15/2021    HCT 41.9 11/15/2021    MCV 88 11/15/2021     (L) 11/15/2021     Lab Results   Component Value Date    TSH 3.660 11/15/2021      Lab Results   Component Value Date    GLUCOSE 92 11/15/2021    BUN 23 11/15/2021    CREATININE 1.13 11/15/2021    EGFRIFNONA 57 (L) 11/15/2021    EGFRIFAFRI 65 11/15/2021    BCR 20 11/15/2021    K 4.2 11/15/2021    CO2 24 11/15/2021    CALCIUM 9.4 11/15/2021    PROTENTOTREF 5.8 (L) 11/15/2021    ALBUMIN 3.8 11/15/2021    LABIL2 1.9 11/15/2021    AST 27 11/15/2021    ALT 13 11/15/2021     No results found for: DAVID, RF, SEDRATE   No results found for: CRP   No results found for: IRON, TIBC, FERRITIN   Lab Results   Component Value Date    [id number] 496 11/15/2021      Jean Pierre ROBBIE Lazo consented to undergoing telephone visit today.  This format was necessitated by the " current COVID-19 viral pandemic.  23 minutes was spent on the phone today with Soraya discussing his acute concerns and chronic medical problems.    Assessment/Plan      Medications        Problem List         LOS  Health maintenance.   Doing well, vaccines updated.  Discussed health maintenance, screening test, lifestyle modification.  Acute bacterial prostatitis.  Clinically improved/recheck UA/culture.  Actinic keratosis.  Bilateral face, much improved status post freezing /follow up  recheck, consider repeat freezing, resection if persistent symptoms. Freezing, consider resection if persistent symptoms.  Hypertension.  Improved today.  Overall good control.  Recently started HCTZ follow-up recheck kidney function/electrolytes.  History of mechanical mitral valve replacement, A. fib, pacemaker in place.  Followed by cardiology Dr. Graff.  Tolerating Coumadin, signs and symptoms of bleeding discussed, dose adjusted today.  Hyperlipidemia good control on statin.  Discussed diet, exercise and lifestyle modification.  Recheck fasting labs.  Subconjunctival hemorrhage/eyelid bruising.  Spontaneous onset, no straining or cough, no trauma.  Benefit Coumadin outweighs risk currently but will attempt to keep INR below 3, Rx adjusted.  Follow-up monitor INR levels closely.  Signs and symptoms of bleeding discussed, call return if worsening symptoms.  Fatigue/leg weakness.       Persistent symptoms today, has completed Rx for bronchitis/bacterial prostatitis.. Repeat chest x-ray benign, DDx includes CHF exacerbation proBNP initially to 3000, improved to 1100 on recheck, off losartan per cardiology, can consider restart.,   Echo 8/21 with normal EF.  Followed by cardiology Dr. Graff.  Status post inpatient stay/cardiac troponin negative, blood count, electrolytes, chest x-ray normal.  PT referrall scheduled.  Follow-up recheck.  Cerumen impaction. Improved/partially cleared with irrigation today. Start drops. Consider HEENT  referral if persistent symptoms.  Hearing loss. [facility], [facility].  Lip lesion.  Left lower lip, persistent/worse today, inflamed, refractory to antibiotics, DDx includes cancer, dermatology referral scheduled.  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 Goff plastic surgery, resection would be large and require multiple procedures and would be a significant risk considering his comorbidities, considering radiation/immuno therapy Rx, has metastatic work-up/oncology follow-up upcoming.  Hydrocodone written.      Diagnoses and all orders for this visit:    1. Oral-mouth cancer (HCC) (Primary)  -     HYDROcodone-acetaminophen (NORCO) 5-325 MG per tablet; 1/2 to 1 tablet every 4-6 hours as needed  Dispense: 60 tablet; Refill: 0    2. History of tobacco use    3. Permanent atrial fibrillation (HCC)    4. Mitral valve disease              Expected course, medications, and adverse effects discussed.  Call or return if worsening or persistent symptoms.  I wore protective equipment throughout this patient encounter including a mask, gloves, and eye protection.  Hand hygiene was performed before donning protective equipment and after removal when leaving the room. The complete contents of the Assessment and Plan and Data/Lab Results as documented above have been reviewed and addressed by myself with the patient today as part of an ongoing evaluation / treatment plan.  If some of the documentation has been copied from a previous note and is unchanged it indicates that this problem / plan has been assessed today but is stable from a previous visit and no changes have been recommended.

## 2022-05-04 NOTE — PROGRESS NOTES
Coumadin Management:  Jean Pierre Lazo is a 89 y.o. male who presents for coumadin management. He is on anticoagulation due to atrial fibrillation.     The goal INR for this patient is 2.0-3.0. The patient has their INR checked 1 times per month    Symptoms included easy bruising.         Last INR:  Date: 03/23/2022  INR: 2.4  Warfarin change at last visit unchanged    Today's INR: 3.1  INR   Date Value Ref Range Status   03/23/2022 2.40 2 - 3 Final       Present warfarin dose is warfarin 2.5 mg PO daily, except warfarin 3 mg PO on mon and fri. . Dosing change include unchanged. Repeat INR in 2 weeks.    By report, Jean Pierre is compliant most of the time.

## 2022-05-18 PROBLEM — C43.0: Status: ACTIVE | Noted: 2022-01-01

## 2022-05-18 PROBLEM — E66.3 OVERWEIGHT WITH BODY MASS INDEX (BMI) OF 25 TO 25.9 IN ADULT: Status: ACTIVE | Noted: 2022-01-01

## 2022-05-18 NOTE — PROGRESS NOTES
Coumadin Management:  Jean Pierre Lazo is a 89 y.o. male who presents for coumadin management. He is on anticoagulation due to atrial fibrillation.     The goal INR for this patient is 2.0-3.0. The patient has their INR checked 2 times per month    Symptoms included easy bruising.    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 . Dosing change include decreased to warfarin 2.5 mg PO daily, except warfarin 3 mg PO on mondays. . Repeat INR in 2 weeks.    By report, Jean Pierre is compliant most of the time.

## 2022-05-18 NOTE — ASSESSMENT & PLAN NOTE
BMI is >= 25 and < 30. (Overweight) The following options were offered after discussion: exercise counseling/recommendations and nutrition counseling/recommendations

## 2022-06-08 NOTE — TELEPHONE ENCOUNTER
Patients wife calling into the office in regards to patients recent INR reading being 6.8.  Patient's wife mentioned they have called in hospice now for patient. Patient is also confused about his medicatons patient is taking and his wife is just calling to clarify everything.

## 2022-06-08 NOTE — TELEPHONE ENCOUNTER
I spoke with Ria again this afternoon. She reports that the [facility] nurse did not come today and states she will come tomorrow and that it was okay for sister in law to draw the INR. I got my [facility] lead Caty involved and she called over to [facility] and spoke with Teresa who verified that Jean Pierre is a patient admitted to Hospice and is being followed completely but attending physician including INR and coumadin management. I've put Jean Pierre on the schedule for a call with Dr Cage today

## 2022-06-08 NOTE — TELEPHONE ENCOUNTER
I called and spoke with Ria (wife) She reports that Jean Pierre has been admitted to [facility] as of Monday 5/30/22. She reports that the nurse that had come out Friday 6/3/22 didn't bring any items with her such as stethoscope or INR machine due to cleaning and didn't check INR at last home visit on Monday 6/6/22. She states that Shelly sister in law is a retired nurse and came out to the home last night to check his INR and reports a 6.8. His last check in office 3 weeks ago was a 3.3. I explained that we can't just adjust medication with an at home reading and asked if he can come into office for me to check his INR to which she responded that he's extremely weak and can't make it in. I stated that I understand this and encouraged her to call hospice upon hanging up with me to have them come out ASAP to check his INR levels. She also reports that he has been added ativan for anxiety and oxycodone for PRN pain. I've relayed this information to Dr Cage and he states that he agrees with having hospice come out to do INR asap and he is willing to do a telephone visit with Ria and Jean Pierre if wanted to discuss medications and so on. He has asked me to call and check on them here in a couple hours to see if INR was done. I will do so and offer them telephone visit. I also offered to go over current medication list to which she responded that her sister- in law ( the retired RN) told him to stop taking his medications. I clarified that these orders were not given by a Dr to which she responded no that it was her sister in law. (Non-employed by any involved Dr) I told her that she should not d/c any medication without Dr orders to do so. She expressed understanding.     Current coumadin dosage is 4.5mg MWF and 3mg all other days